# Patient Record
Sex: MALE | Race: WHITE | URBAN - METROPOLITAN AREA
[De-identification: names, ages, dates, MRNs, and addresses within clinical notes are randomized per-mention and may not be internally consistent; named-entity substitution may affect disease eponyms.]

---

## 2017-07-14 ENCOUNTER — TRANSFERRED RECORDS (OUTPATIENT)
Dept: HEALTH INFORMATION MANAGEMENT | Facility: CLINIC | Age: 72
End: 2017-07-14

## 2017-07-17 ENCOUNTER — TRANSFERRED RECORDS (OUTPATIENT)
Dept: HEALTH INFORMATION MANAGEMENT | Facility: CLINIC | Age: 72
End: 2017-07-17

## 2018-01-24 ENCOUNTER — RADIANT APPOINTMENT (OUTPATIENT)
Dept: GENERAL RADIOLOGY | Facility: CLINIC | Age: 73
End: 2018-01-24
Attending: FAMILY MEDICINE
Payer: COMMERCIAL

## 2018-01-24 ENCOUNTER — OFFICE VISIT (OUTPATIENT)
Dept: ORTHOPEDICS | Facility: CLINIC | Age: 73
End: 2018-01-24
Payer: COMMERCIAL

## 2018-01-24 VITALS
BODY MASS INDEX: 26.21 KG/M2 | DIASTOLIC BLOOD PRESSURE: 71 MMHG | SYSTOLIC BLOOD PRESSURE: 116 MMHG | WEIGHT: 172.9 LBS | HEIGHT: 68 IN | OXYGEN SATURATION: 96 % | HEART RATE: 81 BPM

## 2018-01-24 DIAGNOSIS — M17.12 PRIMARY OSTEOARTHRITIS OF LEFT KNEE: ICD-10-CM

## 2018-01-24 DIAGNOSIS — M25.562 ARTHRALGIA OF LEFT LOWER LEG: Primary | ICD-10-CM

## 2018-01-24 DIAGNOSIS — M25.569 PAIN IN JOINT, LOWER LEG: ICD-10-CM

## 2018-01-24 PROCEDURE — 99213 OFFICE O/P EST LOW 20 MIN: CPT | Performed by: FAMILY MEDICINE

## 2018-01-24 PROCEDURE — 73562 X-RAY EXAM OF KNEE 3: CPT | Mod: LT | Performed by: RADIOLOGY

## 2018-01-24 ASSESSMENT — PAIN SCALES - GENERAL: PAINLEVEL: SEVERE PAIN (6)

## 2018-01-24 NOTE — LETTER
1/24/2018         RE: Jaswinder Li  [address] [facility] [id number]        Dear Colleague,    Thank you for referring your patient, Jaswinder Li, to the [facility]. Please see a copy of my visit note below.    CHIEF COMPLAINT:  Consult (left knee pain X 3years. )       HISTORY OF PRESENT ILLNESS  Mr. Li is a pleasant 72 year old year old male who presents to clinic today with left knee pain.    Luis Antonio has had bilateral knee pain for many years.  He had a right medial partial knee replacement done 11 years ago by Dr. Jess Bobo.  This completely took care of his pain.  He continues to have left knee pain, medial side only, that has bothered him since his right knee replacement.  He has been seen and followed at [facility], over the years she has had multiple corticosteroid injections and other conservative treatments.  His most recent corticosteroid injection was in June 2017.    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  He is interested in discussing knee replacement.      Additional history: as documented    MEDICAL HISTORY  Patient Active Problem List   Diagnosis     BCC (basal cell carcinoma), face       Current Outpatient Prescriptions   Medication Sig Dispense Refill     IBUPROFEN PO        aspirin 325 MG EC tablet Take  by mouth daily.       terazosin (HYTRIN) 2 MG capsule Take  by mouth At Bedtime.         No Known Allergies    History reviewed. No pertinent family history.    Additional medical/Social/Surgical histories reviewed in [facility] and updated as appropriate.     REVIEW OF SYSTEMS (1/24/2018)  CONSTITUTIONAL: Denies fever and weight loss  EYES: Denies acute vision changes  ENT: Denies hearing changes or difficulty swallowing  CARDIAC: Denies chest pain or edema  RESPIRATORY: Denies dyspnea, cough or wheeze  GASTROINTESTINAL: Denies abdominal pain, nausea, vomiting  MUSCULOSKELETAL: See HPI  SKIN: Denies any  "recent rash or lesion  NEUROLOGICAL: Denies numbness or focal weakness  PSYCHIATRIC: No history of psychiatric symptoms or problems  ENDOCRINE: Denies current diagnosis of diabetes  HEMATOLOGY: Denies episodes of easy bleeding      PHYSICAL EXAM  Vitals:    01/24/18 1103   BP: 116/71   Pulse: 81   SpO2: 96%   Weight: 78.4 kg (172 lb 14.4 oz)   Height: 1.72 m (5' 7.72\")     General  - normal appearance, in no obvious distress  CV  - normal popliteal pulse  Pulm  - normal respiratory pattern, non-labored  Musculoskeletal - left knee  - stance: genu varum  - inspection: no swelling, no effusion  - palpation: medial joint line tenderness, patella and patellar tendon non-tender, normal popliteal pulse  - ROM: 135 degrees flexion, 0 degrees extension  - strength: 5/5 in flexion, 5/5 in extension  - neuro: no sensory or motor deficit  Neuro  - no sensory or motor deficit, grossly normal coordination, normal muscle tone  Skin  - no ecchymosis, erythema, warmth, or induration, no obvious rash  Psych  - interactive, appropriate, normal mood and affect        ASSESSMENT & PLAN  Mr. Li is a 72 year old year old male who presents to clinic today with left knee osteoarthritis.    I ordered and reviewed plain radiographs of his knee.  He does have severe medial compartment narrowing.    We had a preliminary discussion centering around total knee arthroplasty and partial knee arthroplasty, I am referring him to Dr. Branden Fernandez to have this discussion in depth.  Luis Antonio's symptoms are controlled for now, he is going to continue conservative measures until his appointment.    It was a pleasure seeing Luis Antonio.    Donald Bobo DO, [facility]  Primary Care Sports Medicine      Again, thank you for allowing me to participate in the care of your patient.        Sincerely,        Donald Bobo DO    "

## 2018-01-24 NOTE — PROGRESS NOTES
CHIEF COMPLAINT:  Consult (left knee pain X 3years. )       HISTORY OF PRESENT ILLNESS  Mr. Li is a pleasant 72 year old year old male who presents to clinic today with left knee pain.    Luis Antonio has had bilateral knee pain for many years.  He had a right medial partial knee replacement done 11 years ago by Dr. Jess Bobo.  This completely took care of his pain.  He continues to have left knee pain, medial side only, that has bothered him since his right knee replacement.  He has been seen and followed at [facility], over the years she has had multiple corticosteroid injections and other conservative treatments.  His most recent corticosteroid injection was in June 2017.    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  He is interested in discussing knee replacement.      Additional history: as documented    MEDICAL HISTORY  Patient Active Problem List   Diagnosis     BCC (basal cell carcinoma), face       Current Outpatient Prescriptions   Medication Sig Dispense Refill     IBUPROFEN PO        aspirin 325 MG EC tablet Take  by mouth daily.       terazosin (HYTRIN) 2 MG capsule Take  by mouth At Bedtime.         No Known Allergies    History reviewed. No pertinent family history.    Additional medical/Social/Surgical histories reviewed in [facility] and updated as appropriate.     REVIEW OF SYSTEMS (1/24/2018)  CONSTITUTIONAL: Denies fever and weight loss  EYES: Denies acute vision changes  ENT: Denies hearing changes or difficulty swallowing  CARDIAC: Denies chest pain or edema  RESPIRATORY: Denies dyspnea, cough or wheeze  GASTROINTESTINAL: Denies abdominal pain, nausea, vomiting  MUSCULOSKELETAL: See HPI  SKIN: Denies any recent rash or lesion  NEUROLOGICAL: Denies numbness or focal weakness  PSYCHIATRIC: No history of psychiatric symptoms or problems  ENDOCRINE: Denies current diagnosis of diabetes  HEMATOLOGY: Denies episodes of easy bleeding      PHYSICAL EXAM  Vitals:  "   01/24/18 1103   BP: 116/71   Pulse: 81   SpO2: 96%   Weight: 78.4 kg (172 lb 14.4 oz)   Height: 1.72 m (5' 7.72\")     General  - normal appearance, in no obvious distress  CV  - normal popliteal pulse  Pulm  - normal respiratory pattern, non-labored  Musculoskeletal - left knee  - stance: genu varum  - inspection: no swelling, no effusion  - palpation: medial joint line tenderness, patella and patellar tendon non-tender, normal popliteal pulse  - ROM: 135 degrees flexion, 0 degrees extension  - strength: 5/5 in flexion, 5/5 in extension  - neuro: no sensory or motor deficit  Neuro  - no sensory or motor deficit, grossly normal coordination, normal muscle tone  Skin  - no ecchymosis, erythema, warmth, or induration, no obvious rash  Psych  - interactive, appropriate, normal mood and affect        ASSESSMENT & PLAN  Mr. Li is a 72 year old year old male who presents to clinic today with left knee osteoarthritis.    I ordered and reviewed plain radiographs of his knee.  He does have severe medial compartment narrowing.    We had a preliminary discussion centering around total knee arthroplasty and partial knee arthroplasty, I am referring him to Dr. Branden Fernandez to have this discussion in depth.  Luis Antonio's symptoms are controlled for now, he is going to continue conservative measures until his appointment.    It was a pleasure seeing Luis Antonio.    Donald Bobo DO, [facility]  Primary Care Sports Medicine    "

## 2018-01-24 NOTE — NURSING NOTE
"Jaswinder Li's goals for this visit include: evaluate left knee pain  He requests these members of his care team be copied on today's visit information: yes    PCP: Alberto Garza    Referring Provider:  No referring provider defined for this encounter.    Chief Complaint   Patient presents with     Consult     left knee pain X 3years.        Initial /71  Pulse 81  Ht 1.72 m (5' 7.72\")  Wt 78.4 kg (172 lb 14.4 oz)  SpO2 96%  BMI 26.51 kg/m2 Estimated body mass index is 26.51 kg/(m^2) as calculated from the following:    Height as of this encounter: 1.72 m (5' 7.72\").    Weight as of this encounter: 78.4 kg (172 lb 14.4 oz).  Medication Reconciliation: complete    "

## 2018-01-24 NOTE — MR AVS SNAPSHOT
After Visit Summary   2018    Jaswinder Li    MRN: [id number]           Patient Information     Date Of Birth          1945        Visit Information        Provider Department      2018 11:00 AM Donald Bobo,  [facility]        Care Instructions    Thanks for coming today.  Ortho/Sports Medicine Clinic  [address] [place] [address]    To schedule future appointments in Ortho Clinic, you may call [phone].    To schedule ordered imaging by your provider:   Call Central Imaging [phone]    To schedule an injection ordered by your provider:  Call Central Imaging Injection scheduling line: [phone]  Pretty Simple available online at:  [email or web address]    Please call if any further questions or concerns ([phone]).  Clinic hours 8 am to 5 pm.    Return to clinic (call) if symptoms worsen or fail to improve.            Follow-ups after your visit        Who to contact     If you have questions or need follow up information about today's clinic visit or your schedule please contact [facility] directly at [phone].  Normal or non-critical lab and imaging results will be communicated to you by MyChart, letter or phone within 4 business days after the clinic has received the results. If you do not hear from us within 7 days, please contact the clinic through NEMOPTIChart or phone. If you have a critical or abnormal lab result, we will notify you by phone as soon as possible.  Submit refill requests through Pretty Simple or call your pharmacy and they will forward the refill request to us. Please allow 3 business days for your refill to be completed.          Additional Information About Your Visit        MyChart Information     Pretty Simple is an electronic gateway that provides easy, online access to your medical records. With Pretty Simple, you can request a clinic appointment, read your test results, renew a prescription or  "communicate with your care team.     To sign up for [facility] visit the website at [email or web address]   You will be asked to enter the access code listed below, as well as some personal information. Please follow the directions to create your username and password.     Your access code is: [email or web address]  Expires: 2018 11:34 AM     Your access code will  in 90 days. If you need help or a new code, please contact your [facility] or call [phone] for assistance.        Care EveryWhere ID     This is your Care EveryWhere ID. This could be used by other organizations to access your [facility] medical records  [id number]        Your Vitals Were     Pulse Height Pulse Oximetry BMI (Body Mass Index)          81 1.72 m (5' 7.72\") 96% 26.51 kg/m2         Blood Pressure from Last 3 Encounters:   18 116/71   13 111/69   11 122/63    Weight from Last 3 Encounters:   18 78.4 kg (172 lb 14.4 oz)   11 77.1 kg (170 lb)              Today, you had the following     No orders found for display       Primary Care Provider Office Phone # Fax #    Alberto Garza [phone]       XXX RETIRED XXX  XXX [place] [address]        Equal Access to Services     [facility]: Hadii quan morales hadstano Somatilda, waaxda luqadaha, qaybta kaalmada lois, deirdre buckner . So [facility] [phone].    ATENCIÓN: Si habla español, tiene a de la rosa disposición servicios gratuitos de asistencia lingüística. Mell al [phone].    We comply with applicable federal civil rights laws and [place] laws. We do not discriminate on the basis of race, color, national origin, age, disability, sex, sexual orientation, or gender identity.            Thank you!     Thank you for choosing [facility]  for your care. Our goal is always to provide you with excellent care. Hearing back from our patients is one way we can continue to improve " our services. Please take a few minutes to complete the written survey that you may receive in the mail after your visit with us. Thank you!             Your Updated Medication List - Protect others around you: Learn how to safely use, store and throw away your medicines at www.disposemymeds.org.          This list is accurate as of 1/24/18 11:34 AM.  Always use your most recent med list.                   Brand Name Dispense Instructions for use Diagnosis    aspirin 325 MG EC tablet      Take  by mouth daily.        IBUPROFEN PO           terazosin 2 MG capsule    HYTRIN     Take  by mouth At Bedtime.

## 2018-01-24 NOTE — PATIENT INSTRUCTIONS
Thanks for coming today.  Ortho/Sports Medicine Clinic  [address] [place] [address]    To schedule future appointments in Ortho Clinic, you may call [phone].    To schedule ordered imaging by your provider:   Call Central Imaging [phone]    To schedule an injection ordered by your provider:  Call Central Imaging Injection scheduling line: [phone]  [email or web address] available online at:  [email or web address]    Please call if any further questions or concerns ([phone]).  Clinic hours 8 am to 5 pm.    Return to clinic (call) if symptoms worsen or fail to improve.

## 2018-02-06 ENCOUNTER — TELEPHONE (OUTPATIENT)
Dept: ORTHOPEDICS | Facility: CLINIC | Age: 73
End: 2018-02-06

## 2018-02-06 ENCOUNTER — OFFICE VISIT (OUTPATIENT)
Dept: ORTHOPEDICS | Facility: CLINIC | Age: 73
End: 2018-02-06
Payer: COMMERCIAL

## 2018-02-06 VITALS — HEART RATE: 82 BPM | OXYGEN SATURATION: 94 % | DIASTOLIC BLOOD PRESSURE: 76 MMHG | SYSTOLIC BLOOD PRESSURE: 124 MMHG

## 2018-02-06 DIAGNOSIS — M25.562 LEFT KNEE PAIN, UNSPECIFIED CHRONICITY: Primary | ICD-10-CM

## 2018-02-06 DIAGNOSIS — M17.0 PRIMARY OSTEOARTHRITIS OF BOTH KNEES: Primary | ICD-10-CM

## 2018-02-06 PROCEDURE — 99204 OFFICE O/P NEW MOD 45 MIN: CPT | Performed by: ORTHOPAEDIC SURGERY

## 2018-02-06 ASSESSMENT — PAIN SCALES - GENERAL: PAINLEVEL: MODERATE PAIN (5)

## 2018-02-06 NOTE — PATIENT INSTRUCTIONS
[facility]  [address] [place] [address]  Phone [phone]  Fax [phone]    SURGICAL INFORMATION & INSTRUCTIONS  Dr. Branden Fernandez  Name of Surgery: Left total knee arthroplasty    Date of Surgery:     A surgery scheduler will contact you within 1 week of your office visit with the surgeon.  If you don't receive a phone call, please call [phone].    Arrival Time:     Time of Surgery:      Please arrive early so that we can prepare you for surgery, if you arrive later than your scheduled arrival time your surgery may be cancelled.  Please note that scheduled times may change.      Location of Surgery:     ? [facility]  [address]  [place], [place] [id number] [place] [address], 3rd floor for check-in  Phone [phone]  Fax [phone]  Bring parking ticket with you for validation and reduced parking rate  [email or web address]    Prior to surgery    ? Call your insurance company  Ask if you need pre-approval for your surgery.  If pre-approval is needed, please call our surgery scheduler for assistance with the pre-approval process.   If you do not have insurance, please let us know.     ? [place] [facility] Surgery (if on [facility])  10 days before surgery, call [phone] to register with the surgery center. Have your insurance card ready.     Total Joint Replacement:  - Joint Replacement Class:  If you are scheduled to have a joint replacement, you (and any family/friends that will be helping to care for you) are expected to attend an educational class at least two weeks prior to your surgery.  To register for the class please call the Patient Learning Center at [phone] or register online at [email or web address]. Classes are held at multiple locations as well as online.  You must know the date of your surgery before you can register for a class (approximate date OK). If  registering online, please select hip or knee as surgery type as shoulder has not been made an option at this time.     o This is also a good opportunity to think about where you would like to have physical therapy after your surgery. You may also be able to set up appointments in advance so that everything is ready to go after surgery.    - Antibiotics Prophylaxis:  Certain procedures such as dental cleaning/work, colonoscopies and other surgeries can cause bacteria to enter the bloodstream.  These bacteria can attach to joint replacement devices.  To reduce this risk, you will need to take antibiotics before you have invasive procedures or dental work.  This is known as antibiotic prophylaxis.    - Dental Visit:  You may want to schedule an appointment with your dentist for a cleaning or needed work before your surgery.  We advise no dental work or cleaning until 6 months after your surgery with implants to hip(s), knee(s), or shoulder(s).  Once you are 6 months past your surgery, you will need to take prophylactic (preventative) antibiotics for the rest of your life before having any dental cleaning or work.  If dental work is needed before surgery, make sure everything is completely healed prior to surgery.  It may cause delays or cancellation of surgery if not healed completely. This is also for any other invasive procedures you may have such as a colonoscopy.  Please notify the clinic if you have any questions.    ? Schedule an appointment with a Primary Care Provider for a Pre-Op Physical.  This should be done within 30 days of surgery  If you do not have a primary care provider, you may call [facility] at [phone], for an appointment.  Please have your office note and any labs or tests faxed to the facility where you are having surgery. Please be sure to request a copy of your pre-op physical and bring it with you on the day of surgery.      Tell your provider if you have any of the following:    - IF you have a pacemaker or an ICD (implanted cardiac defibrillator). If you do, please bring the ID card with you on the day of surgery  - IF you're a smoker. People who smoke have a higher risk of infection after surgery. Ask your provider how you can quit smoking.  - If you have diabetes, work with your provider to control your blood sugar. If its not well-controlled, we may need to delay surgery (or you may have problems healing afterward).  - If your surgeon asks you to see your dentist: You'll need to complete any dental work before surgery. Your dentist must send a letter to your surgery center saying it's ok to do the surgery.    ? Blood Bank Pre-Admission order (total joint replacement only):    You will need to have a Blood Type and Screen drawn at a [facility] location before your surgery.  Please check your form included in your packet to determine if it needs to be done 1-30 days before surgery or 1-3 days before surgery.    ? Pre-Op Phone Call  You will receive a pre-op phone call 1-3 days before surgery to review your eating and drinking restrictions, review medications, and confirm surgery times.      ? 7-10 days BEFORE surgery  ? Stop taking aspirin, Plavix or aspirin products 10 days before surgery or as directed by your doctor.  ? Stop taking non-steroidal anti-inflammatory medications (naproxen/Aleve, ibuprofen/Advil/Motrin, celecoxib/Celebrex, meloxicam/Mobic) 1 week before surgery or as directed by your surgeon.  This will reduce the risk of bleeding during surgery.  ? Stop taking fish oil (Omega-3-fatty acid) 1 week before surgery.  ? It is OK to take acetaminophen (Tylenol) up until 2 hours prior to surgery.  ? Take prescription medications as directed by your doctor.  Discuss which medications to take or hold prior to your surgery, with your primary care doctor.   ? If you have diabetes, ask your primary care doctor or endocrinologist how you should take your  medication(s).    ? 24 hours BEFORE surgery  Stop drinking alcohol (beer, wine, liquor) at least 24-hours before and after your surgery.     ? Evening BEFORE surgery  - You may eat a normal meal the night before surgery, but eat nothing after midnight.     - Take a shower - to help wash away bacteria (germs) from your skin.  It s normal to have bacteria on your skin and skin protects us from these germs.  When you have surgery, we cut the skin.  Sometimes germs get into the cuts and cause infection (illness caused by germs).  By following the showering instructions and using the special soap, you will lower the number of germs on your skin.  This decreases your chance of an infection.    - Buy or get 8 ounces of antiseptic surgical soap called 4% CHG.  Common brands of this soap are Hibiclens and Exidine.    - You can find it this soap at your local pharmacy, clinic or retail store.  If you have trouble finding it, ask your pharmacist to help you find the right substitute.    - Do not shave within 12 inches of your incision (surgical cut) area for at least 3 days before surgery.  Shaving can make small cuts in the skin. This puts you at a higher risk of infection.    Items you will need for each shower:   - Newly washed towel  - 4 ounces of one of the recommended soaps    Follow these instructions, the evening before surgery  - Wash your hair and body with your regular shampoo and soap. Make sure you rinse the shampoo and soap from your hair and body.  - Using clean hands, apply about 2 ounces of soap gently on your skin from the neck to your toes. Use on your groin area last. Do not use this soap on your face or head. If you get any soap in your eyes, ears or mouth, rinse right away.  - Once the soap has been on your skin for at least 1 minute, rinse off completely and repeat washing with the surgical soap one more time.  - Rinse well and dry off using a clean towel.  If you feel any tingling, itching or other  irritation, rinse right away. It is normal to feel some coolness on the skin after using the antiseptic soap. Your skin may feel a bit dry after the shower, but do not use any lotions, creams or moisturizers. Do not use hair spray or other products in your hair.  - Dress in freshly washed clothes or pajamas. Use fresh pillowcases and sheets on your bed.    ? Day of Surgery  - You may drink clear liquids up to 2 hours before surgery or as directed by your surgeon.  Clear liquids include: Water, Pedialyte, Gatorade, apple juice and liquids you can read through. Please avoid liquids that are red or orange in color.   - Do NOT drink: milk, coffee, liquids that have pulp, orange juice, and lemonade or tomato juice.   - Do NOT chew gum, chew tobacco or suck on hard candy.    - Take another shower          Follow these instructions:      - Wash your hair and body with your regular shampoo and soap. Make sure you rinse the shampoo and soap from your hair and body.  - Using clean hands, apply about 2 ounces of soap gently on your skin from the neck to your toes. Use on your groin area last. Do not use this soap on your face or head. If you get any soap in your eyes, ears or mouth, rinse right away.  - Once the soap has been on your skin for at least 1 minute, rinse off completely and repeat washing with the surgical soap one more time.  - Rinse well and dry off using a clean towel.  If you feel any tingling, itching or other irritation, rinse right away. It is normal to feel some coolness on the skin after using the antiseptic soap. Your skin may feel a bit dry after the shower, but do not use any lotions, creams or moisturizers. Do not use hair spray or other products in your hair.  - Dress in freshly washed clothes.  - Do not wear deodorant, cologne, lotion, makeup, nail polish or jewelry to surgery.    ? If there is any change in your health PRIOR to your surgery, please contact your surgeon's office.  Such as a fever,  body aches, fatigue, any flu-like symptoms, rash, or any new injury to related body part.    ? Arrange for someone to drive you home after surgery.    will need to be a responsible adult (18 years or older) that will provide transportation to and from surgery and stay in the waiting room during your surgery. You may not drive yourself or take public transportation to and from surgery.    ? Arrange for someone to stay with you for 24 hours after you go home.   This person must be a responsible adult (18 years or older).    ? Bring these items to the hospital/surgery center:   ? Insurance card(s)  ? Money for parking and co-pays, if needed  ? A list of all the medications you take, including vitamins, minerals, herbs and over the counter medications.    ? A copy of your Advance Health Care Directive, if you have one.  This tells us what treatment(s) you would or would not want, and who would make health care decisions, if you could no longer speak for yourself.    ? A case for glasses, contact lenses, hearing aids or dentures.   ? Your inhaler or CPAP machine, if you use these at home    ? Leave extra cash, jewelry and other valuables at home.       ? Other information:   Sleep Apnea: Let your nurse know if you have a history of sleep apnea, only if you are having surgery at the [facility].    When you arrive for surgery  When you get to the surgery center/hospital, you will:  - Check in. If you are under age 18, you must be with a parent or legal guardian.  - Sign consent forms, if you haven t already. These forms state that you know the risks and benefits of surgery. When you sign the forms, you give us permission to do the surgery. Do not sign them unless you understand what will happen during and after your surgery. If you have any questions about your surgery, ask to speak with your doctor before you sign the forms. If you don t understand the answers, ask again.  - Receive a copy of the  Patient s Bill of Rights. If you do not receive a copy, please ask for one.  - Change into hospital clothes. Your belongings will be placed in a bag. We will return them to you after surgery.  - Meet with the anesthesia provider. He or she will tell you what kind of anesthesia (medicine) will be used to keep you comfortable during surgery.  - Remember: it s okay to remind doctors and nurses to wash their hands before touching you.  - In most cases, your surgeon will use a marker to write his or her initials on the surgery site. This ensures that the exact site is operated on.  - For safety reasons, we will ask you the same questions many times. For example, we may ask your name and birth date over and over again.  - Friends and family can stay with you until it s time for surgery. While you re in surgery, they will be in the waiting area. Please note that cell phones are not allowed in some patient care areas.  - If you have questions about what will happen in the operating room, talk to your care team.  - You will meet with an anesthesiologist, before your surgery.  He or she may reference types of anesthesia commonly used for surgeries:   o General:  This involves the use of an IV for injection of medication and anesthesia. You are put into a sleep and have a breathing tube to assist you with breathing.  o Sedation:  You are asleep, but not so deply that you need a breathing tube.   o Local or Regional: a nerve is injected to numb the surgical area.  o Spinal: you are numbed from the waist down with medicine injected into your back.  o Femoral Nerve Block:  Anesthesia injected into the groin of leg which you are having surgery on.      After surgery  We will move you to a recovery room, where we will watch you closely. If you have any pain or discomfort, tell your nurse. He or she will try to make you comfortable.    - We will move you to your hospital room after you are awake and stable. If you having any pain or  discomfort, please let your nursing staff know.  - Please wash your hands every time you touch the wound or change bandages or dressings.  - Do not submerge the wound in water.  You may not use a bathtub, hot tub, pool, or lake until 3 months after surgery. Any open area can be a source of incoming bacteria, which can lead to infection. Keep all dressings clean and dry.   - Elevate your leg(s) as much as possible to help reduce swelling. You will also receive compression stockings for the surgical leg. Please wear these during the day and fully remove them at night. Continue to wear these for approximately 4 weeks after surgery or until your swelling has resolved.  - If you had a total knee replacement, you will be discharged with a Continuous Passive Motion (CPM) machine. You will use this for 2-6 weeks at home.   - You may put ice on the surgical area for comfort, keeping ice on area for up to 20 minutes then off for 40 minutes.  You may do this the first few days after surgery to help reduce pain and swelling.    - Drink at least 8-10 glasses of liquid each day to help your body heal.  - Keep your lungs clear by coughing and taking deep breaths every couple hours.  This is especially important the first 48 hours after surgery.  - Typically, you will be able to start driving once you are fully off narcotics and able to do a the quick stop test (slamming on the brake) with your right leg without experiencing much pain.  - You will start physical therapy while in the hospital. Once you leave the hospital, you will need to continue going to physical therapy to maintain and improve your range of motion and strength. Please call the physical therapy clinic of your choice to set up an appointment within 1 week of being discharged from the hospital.    - Notify your doctor if you have any of the following:   o Fever of 101 F or higher  o Numbness and/or tingling  o Increased pain, redness or swelling  o Drainage from  wound  o Prolonged or uncontrolled bleeding  o Difficulty with movement    Range of Motion Restrictions  These are strict for 3 months post surgery, but should be followed for life. As your muscles gain strength, you will notice that you will have more range of motion, but your risk for dislocating remains. See patient education packet for details  - NO bending past 90 degrees at the waist (operative side)   - NO crossing your operative leg over or under your non-operative leg  - NO turning your operative leg inward     Follow-up Appointments, in Clinic  If you don't already have an appointment scheduled, please call to set up an appointment at [phone].      ? Nurse Only Appointment (2 weeks post op)  ? Post-Op appointments with provider (6 weeks post op)    Dealing with pain  A nurse will check your comfort level often during your stay. He or she will work with you to manage your pain.  It s expected that you will have pain after surgery.  Our goal is to reduce or minimize pain by:   - Remember pain is real. There are many ways to control pain. We can help you decide what works best for you.  - Ask for pain medicine when you need it.  Don t try to  tough it out --this can make you feel worse. Always take your medicine as ordered.  - Medicine doesn t work the same for everyone. If your medicine isn t working, tell your nurse. There may be other medicines or treatments we can try.  - Medication Refills.  If you need refills on your pain medication, please call the clinic as soon as possible.  It may take 72-business hours to obtain a refill.  Refills must be picked up at check-in 2, [facility] or mailed to a pharmacy of your choice.    - It is expected that you will wean off the pain medications in a timely manner.   - Constipation is a common side effect of pain medication, decreased activity and anesthesia from surgery.  Take a stool softener as prescribed by your doctor at the time of  discharge.  You may also use over the counter medications as needed.  Be sure to increase your fiber (fruits and vegetables) and your water intake.      Please call the clinic at [phone], if you experience any problems or have questions.  If you are having an emergency, always call 820 or seek immediate evaluation at the Emergency Room.    Thank you for selecting the [facility] for your care!  ---------------------------------------------          Thanks for coming today.  Ortho/[facility] Medicine Clinic  [address] [place] [address]    To schedule future appointments in Ortho Clinic, you may call [phone].    To schedule ordered imaging by your provider:   Call Central Imaging [phone]    To schedule an injection ordered by your provider:  Call Central Imaging Injection scheduling line: [phone]  [employer] available online at:  [email or web address]    Please call if any further questions or concerns ([phone]).  Clinic hours 8 am to 5 pm.    Return to clinic (call) if symptoms worsen or fail to improve.

## 2018-02-06 NOTE — NURSING NOTE
"Jaswinder Li's goals for this visit include: Left knee pain  He requests these members of his care team be copied on today's visit information: Yes    PCP: System, Provider Not In    Referring Provider:  No referring provider defined for this encounter.    Chief Complaint   Patient presents with     Consult     Left knee pain       Initial /76 (BP Location: Left arm)  Pulse 82  SpO2 94% Estimated body mass index is 26.51 kg/(m^2) as calculated from the following:    Height as of 1/24/18: 1.72 m (5' 7.72\").    Weight as of 1/24/18: 78.4 kg (172 lb 14.4 oz).  Medication Reconciliation: complete    "

## 2018-02-06 NOTE — TELEPHONE ENCOUNTER
Procedure: left tka  Facility: [facility]  Length: 2hour(s)  Surgeon: Jamar BASS  Anesthesia: Choice  BMI: There is no height or weight on file to calculate BMI. (If over 43 for general or 45 for MAC cannot be scheduled at  [facility])   Pre-op Appointments needed: H&P within 30 days  Post-op appointments needed:2 weeks   nurse only 6 weeks   provider only   needed:  No  Surgery packet/instructions given to patient?  Yes   Special Considerations: none  Ramandeep Martinez RN

## 2018-02-06 NOTE — MR AVS SNAPSHOT
After Visit Summary   2/6/2018    Jaswinder Li    MRN: [id number]           Patient Information     Date Of Birth          1945        Visit Information        Provider Department      2/6/2018 8:00 AM Branden Fernandez MD [facility]        Care Instructions      [facility]  [address] [place] [id number]  Phone [phone]  Fax [phone]    SURGICAL INFORMATION & INSTRUCTIONS  Dr. Branden Fernandez  Name of Surgery: Left total knee arthroplasty    Date of Surgery:     A surgery scheduler will contact you within 1 week of your office visit with the surgeon.  If you don't receive a phone call, please call [phone].    Arrival Time:     Time of Surgery:      Please arrive early so that we can prepare you for surgery, if you arrive later than your scheduled arrival time your surgery may be cancelled.  Please note that scheduled times may change.      Location of Surgery:     ? [facility], [address], 3rd floor for check-in  Phone [phone]  Fax [phone]  Bring parking ticket with you for validation and reduced parking rate  [email or web address]    Prior to surgery    ? Call your insurance company  Ask if you need pre-approval for your surgery.  If pre-approval is needed, please call our surgery scheduler for assistance with the pre-approval process.   If you do not have insurance, please let us know.     ? [facility] (if on [facility])  10 days before surgery, call [phone] to register with the surgery center. Have your insurance card ready.     Total Joint Replacement:  - Joint Replacement Class:  If you are scheduled to have a joint replacement, you (and any family/friends that will be helping to care for you) are expected to attend an educational class at least two weeks prior to your  surgery.  To register for the class please call the Patient Learning Center at [phone] or register online at [email or web address]. Classes are held at multiple locations as well as online.  You must know the date of your surgery before you can register for a class (approximate date OK). If registering online, please select hip or knee as surgery type as shoulder has not been made an option at this time.     o This is also a good opportunity to think about where you would like to have physical therapy after your surgery. You may also be able to set up appointments in advance so that everything is ready to go after surgery.    - Antibiotics Prophylaxis:  Certain procedures such as dental cleaning/work, colonoscopies and other surgeries can cause bacteria to enter the bloodstream.  These bacteria can attach to joint replacement devices.  To reduce this risk, you will need to take antibiotics before you have invasive procedures or dental work.  This is known as antibiotic prophylaxis.    - Dental Visit:  You may want to schedule an appointment with your dentist for a cleaning or needed work before your surgery.  We advise no dental work or cleaning until 6 months after your surgery with implants to hip(s), knee(s), or shoulder(s).  Once you are 6 months past your surgery, you will need to take prophylactic (preventative) antibiotics for the rest of your life before having any dental cleaning or work.  If dental work is needed before surgery, make sure everything is completely healed prior to surgery.  It may cause delays or cancellation of surgery if not healed completely. This is also for any other invasive procedures you may have such as a colonoscopy.  Please notify the clinic if you have any questions.    ? Schedule an appointment with a Primary Care Provider for a Pre-Op Physical.  This should be done within 30 days of surgery  If you do not have a primary care provider, you may call [facility] at [phone], for an appointment.  Please have your office note and any labs or tests faxed to the facility where you are having surgery. Please be sure to request a copy of your pre-op physical and bring it with you on the day of surgery.      Tell your provider if you have any of the following:   - IF you have a pacemaker or an ICD (implanted cardiac defibrillator). If you do, please bring the ID card with you on the day of surgery  - IF you're a smoker. People who smoke have a higher risk of infection after surgery. Ask your provider how you can quit smoking.  - If you have diabetes, work with your provider to control your blood sugar. If its not well-controlled, we may need to delay surgery (or you may have problems healing afterward).  - If your surgeon asks you to see your dentist: You'll need to complete any dental work before surgery. Your dentist must send a letter to your surgery center saying it's ok to do the surgery.    ? Blood Bank Pre-Admission order (total joint replacement only):    You will need to have a Blood Type and Screen drawn at a [facility] location before your surgery.  Please check your form included in your packet to determine if it needs to be done 1-30 days before surgery or 1-3 days before surgery.    ? Pre-Op Phone Call  You will receive a pre-op phone call 1-3 days before surgery to review your eating and drinking restrictions, review medications, and confirm surgery times.      ? 7-10 days BEFORE surgery  ? Stop taking aspirin, Plavix or aspirin products 10 days before surgery or as directed by your doctor.  ? Stop taking non-steroidal anti-inflammatory medications (naproxen/Aleve, ibuprofen/Advil/Motrin, celecoxib/Celebrex, meloxicam/Mobic) 1 week before surgery or as directed by your surgeon.  This will reduce the risk of bleeding during surgery.  ? Stop taking fish oil (Omega-3-fatty acid) 1 week before surgery.  ? It is OK to take acetaminophen (Tylenol) up  until 2 hours prior to surgery.  ? Take prescription medications as directed by your doctor.  Discuss which medications to take or hold prior to your surgery, with your primary care doctor.   ? If you have diabetes, ask your primary care doctor or endocrinologist how you should take your medication(s).    ? 24 hours BEFORE surgery  Stop drinking alcohol (beer, wine, liquor) at least 24-hours before and after your surgery.     ? Evening BEFORE surgery  - You may eat a normal meal the night before surgery, but eat nothing after midnight.     - Take a shower - to help wash away bacteria (germs) from your skin.  It s normal to have bacteria on your skin and skin protects us from these germs.  When you have surgery, we cut the skin.  Sometimes germs get into the cuts and cause infection (illness caused by germs).  By following the showering instructions and using the special soap, you will lower the number of germs on your skin.  This decreases your chance of an infection.    - Buy or get 8 ounces of antiseptic surgical soap called 4% CHG.  Common brands of this soap are Hibiclens and Exidine.    - You can find it this soap at your local pharmacy, clinic or retail store.  If you have trouble finding it, ask your pharmacist to help you find the right substitute.    - Do not shave within 12 inches of your incision (surgical cut) area for at least 3 days before surgery.  Shaving can make small cuts in the skin. This puts you at a higher risk of infection.    Items you will need for each shower:   - Newly washed towel  - 4 ounces of one of the recommended soaps    Follow these instructions, the evening before surgery  - Wash your hair and body with your regular shampoo and soap. Make sure you rinse the shampoo and soap from your hair and body.  - Using clean hands, apply about 2 ounces of soap gently on your skin from the neck to your toes. Use on your groin area last. Do not use this soap on your face or head. If you get  any soap in your eyes, ears or mouth, rinse right away.  - Once the soap has been on your skin for at least 1 minute, rinse off completely and repeat washing with the surgical soap one more time.  - Rinse well and dry off using a clean towel.  If you feel any tingling, itching or other irritation, rinse right away. It is normal to feel some coolness on the skin after using the antiseptic soap. Your skin may feel a bit dry after the shower, but do not use any lotions, creams or moisturizers. Do not use hair spray or other products in your hair.  - Dress in freshly washed clothes or pajamas. Use fresh pillowcases and sheets on your bed.    ? Day of Surgery  - You may drink clear liquids up to 2 hours before surgery or as directed by your surgeon.  Clear liquids include: Water, Pedialyte, Gatorade, apple juice and liquids you can read through. Please avoid liquids that are red or orange in color.   - Do NOT drink: milk, coffee, liquids that have pulp, orange juice, and lemonade or tomato juice.   - Do NOT chew gum, chew tobacco or suck on hard candy.    - Take another shower          Follow these instructions:      - Wash your hair and body with your regular shampoo and soap. Make sure you rinse the shampoo and soap from your hair and body.  - Using clean hands, apply about 2 ounces of soap gently on your skin from the neck to your toes. Use on your groin area last. Do not use this soap on your face or head. If you get any soap in your eyes, ears or mouth, rinse right away.  - Once the soap has been on your skin for at least 1 minute, rinse off completely and repeat washing with the surgical soap one more time.  - Rinse well and dry off using a clean towel.  If you feel any tingling, itching or other irritation, rinse right away. It is normal to feel some coolness on the skin after using the antiseptic soap. Your skin may feel a bit dry after the shower, but do not use any lotions, creams or moisturizers. Do not use  hair spray or other products in your hair.  - Dress in freshly washed clothes.  - Do not wear deodorant, cologne, lotion, makeup, nail polish or jewelry to surgery.    ? If there is any change in your health PRIOR to your surgery, please contact your surgeon's office.  Such as a fever, body aches, fatigue, any flu-like symptoms, rash, or any new injury to related body part.    ? Arrange for someone to drive you home after surgery.    will need to be a responsible adult (18 years or older) that will provide transportation to and from surgery and stay in the waiting room during your surgery. You may not drive yourself or take public transportation to and from surgery.    ? Arrange for someone to stay with you for 24 hours after you go home.   This person must be a responsible adult (18 years or older).    ? Bring these items to the hospital/surgery center:   ? Insurance card(s)  ? Money for parking and co-pays, if needed  ? A list of all the medications you take, including vitamins, minerals, herbs and over the counter medications.    ? A copy of your Advance Health Care Directive, if you have one.  This tells us what treatment(s) you would or would not want, and who would make health care decisions, if you could no longer speak for yourself.    ? A case for glasses, contact lenses, hearing aids or dentures.   ? Your inhaler or CPAP machine, if you use these at home    ? Leave extra cash, jewelry and other valuables at home.       ? Other information:   Sleep Apnea: Let your nurse know if you have a history of sleep apnea, only if you are having surgery at the [facility].    When you arrive for surgery  When you get to the surgery center/hospital, you will:  - Check in. If you are under age 18, you must be with a parent or legal guardian.  - Sign consent forms, if you haven t already. These forms state that you know the risks and benefits of surgery. When you sign the forms, you give us  permission to do the surgery. Do not sign them unless you understand what will happen during and after your surgery. If you have any questions about your surgery, ask to speak with your doctor before you sign the forms. If you don t understand the answers, ask again.  - Receive a copy of the Patient s Bill of Rights. If you do not receive a copy, please ask for one.  - Change into hospital clothes. Your belongings will be placed in a bag. We will return them to you after surgery.  - Meet with the anesthesia provider. He or she will tell you what kind of anesthesia (medicine) will be used to keep you comfortable during surgery.  - Remember: it s okay to remind doctors and nurses to wash their hands before touching you.  - In most cases, your surgeon will use a marker to write his or her initials on the surgery site. This ensures that the exact site is operated on.  - For safety reasons, we will ask you the same questions many times. For example, we may ask your name and birth date over and over again.  - Friends and family can stay with you until it s time for surgery. While you re in surgery, they will be in the waiting area. Please note that cell phones are not allowed in some patient care areas.  - If you have questions about what will happen in the operating room, talk to your care team.  - You will meet with an anesthesiologist, before your surgery.  He or she may reference types of anesthesia commonly used for surgeries:   o General:  This involves the use of an IV for injection of medication and anesthesia. You are put into a sleep and have a breathing tube to assist you with breathing.  o Sedation:  You are asleep, but not so deply that you need a breathing tube.   o Local or Regional: a nerve is injected to numb the surgical area.  o Spinal: you are numbed from the waist down with medicine injected into your back.  o Femoral Nerve Block:  Anesthesia injected into the groin of leg which you are having surgery  on.      After surgery  We will move you to a recovery room, where we will watch you closely. If you have any pain or discomfort, tell your nurse. He or she will try to make you comfortable.    - We will move you to your hospital room after you are awake and stable. If you having any pain or discomfort, please let your nursing staff know.  - Please wash your hands every time you touch the wound or change bandages or dressings.  - Do not submerge the wound in water.  You may not use a bathtub, hot tub, pool, or lake until 3 months after surgery. Any open area can be a source of incoming bacteria, which can lead to infection. Keep all dressings clean and dry.   - Elevate your leg(s) as much as possible to help reduce swelling. You will also receive compression stockings for the surgical leg. Please wear these during the day and fully remove them at night. Continue to wear these for approximately 4 weeks after surgery or until your swelling has resolved.  - If you had a total knee replacement, you will be discharged with a Continuous Passive Motion (CPM) machine. You will use this for 2-6 weeks at home.   - You may put ice on the surgical area for comfort, keeping ice on area for up to 20 minutes then off for 40 minutes.  You may do this the first few days after surgery to help reduce pain and swelling.    - Drink at least 8-10 glasses of liquid each day to help your body heal.  - Keep your lungs clear by coughing and taking deep breaths every couple hours.  This is especially important the first 48 hours after surgery.  - Typically, you will be able to start driving once you are fully off narcotics and able to do a the quick stop test (slamming on the brake) with your right leg without experiencing much pain.  - You will start physical therapy while in the hospital. Once you leave the hospital, you will need to continue going to physical therapy to maintain and improve your range of motion and strength. Please call  the physical therapy clinic of your choice to set up an appointment within 1 week of being discharged from the hospital.    - Notify your doctor if you have any of the following:   o Fever of 101 F or higher  o Numbness and/or tingling  o Increased pain, redness or swelling  o Drainage from wound  o Prolonged or uncontrolled bleeding  o Difficulty with movement    Range of Motion Restrictions  These are strict for 3 months post surgery, but should be followed for life. As your muscles gain strength, you will notice that you will have more range of motion, but your risk for dislocating remains. See patient education packet for details  - NO bending past 90 degrees at the waist (operative side)   - NO crossing your operative leg over or under your non-operative leg  - NO turning your operative leg inward     Follow-up Appointments, in Clinic  If you don't already have an appointment scheduled, please call to set up an appointment at [phone].      ? Nurse Only Appointment (2 weeks post op)  ? Post-Op appointments with provider (6 weeks post op)    Dealing with pain  A nurse will check your comfort level often during your stay. He or she will work with you to manage your pain.  It s expected that you will have pain after surgery.  Our goal is to reduce or minimize pain by:   - Remember pain is real. There are many ways to control pain. We can help you decide what works best for you.  - Ask for pain medicine when you need it.  Don t try to  tough it out --this can make you feel worse. Always take your medicine as ordered.  - Medicine doesn t work the same for everyone. If your medicine isn t working, tell your nurse. There may be other medicines or treatments we can try.  - Medication Refills.  If you need refills on your pain medication, please call the clinic as soon as possible.  It may take 72-business hours to obtain a refill.  Refills must be picked up at check-in 2, [facility] or mailed  to a pharmacy of your choice.    - It is expected that you will wean off the pain medications in a timely manner.   - Constipation is a common side effect of pain medication, decreased activity and anesthesia from surgery.  Take a stool softener as prescribed by your doctor at the time of discharge.  You may also use over the counter medications as needed.  Be sure to increase your fiber (fruits and vegetables) and your water intake.      Please call the clinic at [phone], if you experience any problems or have questions.  If you are having an emergency, always call 171 or seek immediate evaluation at the Emergency Room.    Thank you for selecting the [facility] for your care!  ---------------------------------------------          Thanks for coming today.  Ortho/Sports Medicine Clinic  [address] [place] [address]    To schedule future appointments in Ortho [facility], you may call [phone].    To schedule ordered imaging by your provider:   Call Central Imaging [phone]    To schedule an injection ordered by your provider:  Call Central Imaging Injection scheduling line: [phone]  Builkt available online at:  [email or web address]    Please call if any further questions or concerns ([phone]).  Clinic hours 8 am to 5 pm.    Return to clinic (call) if symptoms worsen or fail to improve.          Follow-ups after your visit        Who to contact     If you have questions or need follow up information about today's clinic visit or your schedule please contact [facility] directly at [phone].  Normal or non-critical lab and imaging results will be communicated to you by MyChart, letter or phone within 4 business days after the clinic has received the results. If you do not hear from us within 7 days, please contact the clinic through MyChart or phone. If you have a critical or abnormal lab result, we will notify you by phone as  soon as possible.  Submit refill requests through [employer] or call your pharmacy and they will forward the refill request to us. Please allow 3 business days for your refill to be completed.          Additional Information About Your Visit        [employer] Information     [employer] is an electronic gateway that provides easy, online access to your medical records. With [employer], you can request a clinic appointment, read your test results, renew a prescription or communicate with your care team.     To sign up for [employer] visit the website at [email or web address]   You will be asked to enter the access code listed below, as well as some personal information. Please follow the directions to create your username and password.     Your access code is: [email or web address]  Expires: 2018 11:34 AM     Your access code will  in 90 days. If you need help or a new code, please contact your [facility] or call [phone] for assistance.        Care EveryWhere ID     This is your Care EveryWhere ID. This could be used by other organizations to access your [facility] medical records  [id number]        Your Vitals Were     Pulse Pulse Oximetry                82 94%           Blood Pressure from Last 3 Encounters:   18 124/76   18 116/71   13 111/69    Weight from Last 3 Encounters:   18 78.4 kg (172 lb 14.4 oz)   11 77.1 kg (170 lb)              Today, you had the following     No orders found for display       Primary Care Provider Fax #    Provider Not In System [phone]                Equal Access to Services     SAGAR BINGHAM : Hadii aad ku hadasho Soomaali, waaxda luqadaha, qaybta kaalmada adeegyada, deirdre buckner . So [facility] [phone].    ATENCIÓN: Si habla español, tiene a de la rosa disposición servicios gratuitos de asistencia lingüística. Llame al [phone].    We comply with applicable federal civil rights laws and [place]  laws. We do not discriminate on the basis of race, color, national origin, age, disability, sex, sexual orientation, or gender identity.            Thank you!     Thank you for choosing [facility]  for your care. Our goal is always to provide you with excellent care. Hearing back from our patients is one way we can continue to improve our services. Please take a few minutes to complete the written survey that you may receive in the mail after your visit with us. Thank you!             Your Updated Medication List - Protect others around you: Learn how to safely use, store and throw away your medicines at www.disposemymeds.org.          This list is accurate as of 2/6/18  8:43 AM.  Always use your most recent med list.                   Brand Name Dispense Instructions for use Diagnosis    aspirin 325 MG EC tablet      Take  by mouth daily.        IBUPROFEN PO           terazosin 2 MG capsule    HYTRIN     Take  by mouth At Bedtime.

## 2018-02-06 NOTE — LETTER
2/6/2018         RE: Jaswinder Li  [address] [facility] [address]        Dear Colleague,    Thank you for referring your patient, Jaswinder Li, to the [facility]. Please see a copy of my visit note below.    Chief Complaint: Consult (Left knee pain)    Physician:  No ref. provider found    HPI: Jaswinder Li is a 72 year old male who presents today for evaluation of his left knee.     Symptom Profile  Location of symptoms:  Medial > lateral joint line   Onset: insidious  Duration of symptoms: multiple years   Quality of symptoms: aching and sharp/stabbing   Severity:severe  Alleviate:activity modification   Exacerbating: activities   Previous Treatments: Previous treatments include activity modification, oral pain medication, multiple intra-articular steroid injection.     MEDICAL HISTORY: No past medical history on file.  BPH  Basal cell CA, tx    Pertinent negatives:  Patient has no history of DVT or PE. Discussed risk factors.    Medications:     Current Outpatient Prescriptions:      IBUPROFEN PO, , Disp: , Rfl:      aspirin 325 MG EC tablet, Take  by mouth daily., Disp: , Rfl:      terazosin (HYTRIN) 2 MG capsule, Take  by mouth At Bedtime., Disp: , Rfl:     Allergies: Review of patient's allergies indicates no known allergies.    SURGICAL HISTORY: No past surgical history on file.   Right knee uni doing very well. 2007  Bilateral CMC arthroplasties     FAMILY HISTORY: No family history on file.    SOCIAL HISTORY:   Social History   Substance Use Topics     Smoking status: Former Smoker     Types: Cigarettes     Smokeless tobacco: Never Used     Alcohol use Not on file   working, repairs fitness equiptment    REVIEW OF SYSTEMS:  The comprehensive review of systems from the intake form was reviewed with the patient.  No fever, weight change or fatigue. No dry eyes. No oral ulcers, sore throat or voice change. No palpitations, syncope, angina or edema.  No chest pain,  excessive sleepiness, shortness of breath or hemoptysis.   No abdominal pain, nausea, vomiting, diarrhea or heartburn.  No skin rash. No focal weakness or numbness. No bleeding or lymphadenopathy. No rhinitis or hives.     Exam:  On physical examination the patient appears the stated age, is in no acute distress, affectThe is appropriate, and breathing is non-labored.  Vitals are documented in the EMR and have been reviewed:    /76 (BP Location: Left arm)  Pulse 82  SpO2 94%  Data Unavailable  There is no height or weight on file to calculate BMI.    Rises from chair: with some effort   Gait: antalgic gait favoring the left side   Gains the exam table: with effort  Knee is benign in appearance without redness or effusion  ROM is , ligament examination is stable  Distally, the circulatory, motor, and sensation exam is intact with 5/5 EHL, gastroc-soleus, and tibialis anterior.  Sensation to light touch is intact.  Dorsalis pedis and posterior tibialis pulses are palpable.  There are no sores on the feet, no bruising, and no lymphedema.    X-rays:   Final Report   EXAM: XR KNEE LT 3 VW 1/24/2018 11:49 AM     HISTORY: ; Pain in joint, lower leg    COMPARISON: Outside study 5/10/2016    FINDINGS: Standing AP, lateral, and patellofemoral views of the right  knee.    No acute osseous abnormality.    Moderate severe medial compartment joint space loss, similar to prior  outside study. No patellar tilt or subluxation. Soft tissues  unremarkable.     IMPRESSION: Moderate severe medial compartment joint space loss.    NEVIN YEUNG MD (Joe)  Principal   Name: NEVIN YEUNG  Provider ID: [id number]      Assessment: This is a 72 year old with severe knee pain interfering with activities of daily living associated with advanced knee osteoarthritis with complete loss of the joint space. He has had comprehensive non-operative management including activity modification, oral pain medication, and  multiple intra-articular injections. We discussed the treatment options including living with it and total knee. We discussed that he may not be the best candidate for a uni like he has on the right because of the shift of the femur on the tibia. We spent approximately 20 minutes discussing the risks and benefits of total knee arthroplasty.  We reviewed the proposed surgical plan.  The discussion included but was not limited to the following:  Blood clots, blood clots to the lungs, injury to blood vessels and nerves, the literature as it pertains to known problems with leg length discrepancy, stiffness, anterior knee pain, and infection. We discussed the post-operative recovery for the typical patient, return to driving, and return to normal activities.  All the patient's questions were answered to the best of my ability and would like to proceed.    Plan:  Left total knee       Again, thank you for allowing me to participate in the care of your patient.        Sincerely,        Branden Fernandez MD

## 2018-02-06 NOTE — PROGRESS NOTES
Chief Complaint: Consult (Left knee pain)    Physician:  No ref. provider found    HPI: Jaswinder Li is a 72 year old male who presents today for evaluation of his left knee.     Symptom Profile  Location of symptoms:  Medial > lateral joint line   Onset: insidious  Duration of symptoms: multiple years   Quality of symptoms: aching and sharp/stabbing   Severity:severe  Alleviate:activity modification   Exacerbating: activities   Previous Treatments: Previous treatments include activity modification, oral pain medication, multiple intra-articular steroid injection.     MEDICAL HISTORY: No past medical history on file.  BPH  Basal cell CA, tx    Pertinent negatives:  Patient has no history of DVT or PE. Discussed risk factors.    Medications:     Current Outpatient Prescriptions:      IBUPROFEN PO, , Disp: , Rfl:      aspirin 325 MG EC tablet, Take  by mouth daily., Disp: , Rfl:      terazosin (HYTRIN) 2 MG capsule, Take  by mouth At Bedtime., Disp: , Rfl:     Allergies: Review of patient's allergies indicates no known allergies.    SURGICAL HISTORY: No past surgical history on file.   Right knee uni doing very well. 2007  Bilateral CMC arthroplasties     FAMILY HISTORY: No family history on file.    SOCIAL HISTORY:   Social History   Substance Use Topics     Smoking status: Former Smoker     Types: Cigarettes     Smokeless tobacco: Never Used     Alcohol use Not on file   working, repairs fitness equiptment    REVIEW OF SYSTEMS:  The comprehensive review of systems from the intake form was reviewed with the patient.  No fever, weight change or fatigue. No dry eyes. No oral ulcers, sore throat or voice change. No palpitations, syncope, angina or edema.  No chest pain, excessive sleepiness, shortness of breath or hemoptysis.   No abdominal pain, nausea, vomiting, diarrhea or heartburn.  No skin rash. No focal weakness or numbness. No bleeding or lymphadenopathy. No rhinitis or hives.     Exam:  On physical  examination the patient appears the stated age, is in no acute distress, affectThe is appropriate, and breathing is non-labored.  Vitals are documented in the EMR and have been reviewed:    /76 (BP Location: Left arm)  Pulse 82  SpO2 94%  Data Unavailable  There is no height or weight on file to calculate BMI.    Rises from chair: with some effort   Gait: antalgic gait favoring the left side   Gains the exam table: with effort  Knee is benign in appearance without redness or effusion  ROM is , ligament examination is stable  Distally, the circulatory, motor, and sensation exam is intact with 5/5 EHL, gastroc-soleus, and tibialis anterior.  Sensation to light touch is intact.  Dorsalis pedis and posterior tibialis pulses are palpable.  There are no sores on the feet, no bruising, and no lymphedema.    X-rays:   Final Report   EXAM: XR KNEE LT 3 VW 1/24/2018 11:49 AM     HISTORY: ; Pain in joint, lower leg    COMPARISON: Outside study 5/10/2016    FINDINGS: Standing AP, lateral, and patellofemoral views of the right  knee.    No acute osseous abnormality.    Moderate severe medial compartment joint space loss, similar to prior  outside study. No patellar tilt or subluxation. Soft tissues  unremarkable.     IMPRESSION: Moderate severe medial compartment joint space loss.    NEVIN YEUNG MD (Joe)  Principal   Name: NEVIN YEUNG  Provider ID: [id number]      Assessment: This is a 72 year old with severe knee pain interfering with activities of daily living associated with advanced knee osteoarthritis with complete loss of the joint space. He has had comprehensive non-operative management including activity modification, oral pain medication, and multiple intra-articular injections. We discussed the treatment options including living with it and total knee. We discussed that he may not be the best candidate for a uni like he has on the right because of the shift of the femur on the tibia.  We spent approximately 20 minutes discussing the risks and benefits of total knee arthroplasty.  We reviewed the proposed surgical plan.  The discussion included but was not limited to the following:  Blood clots, blood clots to the lungs, injury to blood vessels and nerves, the literature as it pertains to known problems with leg length discrepancy, stiffness, anterior knee pain, and infection. We discussed the post-operative recovery for the typical patient, return to driving, and return to normal activities.  All the patient's questions were answered to the best of my ability and would like to proceed.    Plan:  Left total knee

## 2018-02-09 ENCOUNTER — TRANSFERRED RECORDS (OUTPATIENT)
Dept: HEALTH INFORMATION MANAGEMENT | Facility: CLINIC | Age: 73
End: 2018-02-09

## 2018-02-09 NOTE — TELEPHONE ENCOUNTER
PENDING SURGERY ORDERS FROM DR. BRADSHAW    Date Scheduled: 2-21-18  Facility: [facility]  Surgeon: Dr. Bradshaw   Post-op appointment scheduled:    Mar 06, 2018  8:30 AM CST   Nurse Only with MG NURSE ONLY ORTHO   [facility])    [address] [place] [address] [phone]            Mar 20, 2018  8:30 AM CDT   Return Visit with Branden Bradshaw MD   [facility])    [address] [place] [address] [phone]                 scheduled?: No  Surgery packet/instructions confirmed received?  Yes  Special Considerations:   Yolanda Varela, Surgery Scheduling Coordinator

## 2018-02-13 ENCOUNTER — OFFICE VISIT (OUTPATIENT)
Dept: PEDIATRICS | Facility: CLINIC | Age: 73
End: 2018-02-13
Payer: COMMERCIAL

## 2018-02-13 VITALS
HEART RATE: 73 BPM | TEMPERATURE: 97 F | HEIGHT: 66 IN | SYSTOLIC BLOOD PRESSURE: 116 MMHG | BODY MASS INDEX: 27.37 KG/M2 | WEIGHT: 170.3 LBS | DIASTOLIC BLOOD PRESSURE: 70 MMHG | OXYGEN SATURATION: 97 %

## 2018-02-13 DIAGNOSIS — N40.0 BENIGN PROSTATIC HYPERPLASIA WITHOUT LOWER URINARY TRACT SYMPTOMS: ICD-10-CM

## 2018-02-13 DIAGNOSIS — M17.12 PRIMARY OSTEOARTHRITIS OF LEFT KNEE: ICD-10-CM

## 2018-02-13 DIAGNOSIS — M25.562 LEFT KNEE PAIN, UNSPECIFIED CHRONICITY: ICD-10-CM

## 2018-02-13 DIAGNOSIS — Z01.818 PREOP GENERAL PHYSICAL EXAM: Primary | ICD-10-CM

## 2018-02-13 DIAGNOSIS — Z85.828 HX OF SKIN CANCER, BASAL CELL: ICD-10-CM

## 2018-02-13 DIAGNOSIS — Z23 NEED FOR TDAP VACCINATION: ICD-10-CM

## 2018-02-13 LAB
ANION GAP SERPL CALCULATED.3IONS-SCNC: 5 MMOL/L (ref 3–14)
BASOPHILS # BLD AUTO: 0 10E9/L (ref 0–0.2)
BASOPHILS NFR BLD AUTO: 0.5 %
BUN SERPL-MCNC: 21 MG/DL (ref 7–30)
CALCIUM SERPL-MCNC: 8.9 MG/DL (ref 8.5–10.1)
CHLORIDE SERPL-SCNC: 108 MMOL/L (ref 94–109)
CO2 SERPL-SCNC: 27 MMOL/L (ref 20–32)
CREAT SERPL-MCNC: 0.87 MG/DL (ref 0.66–1.25)
DIFFERENTIAL METHOD BLD: NORMAL
EOSINOPHIL # BLD AUTO: 0.1 10E9/L (ref 0–0.7)
EOSINOPHIL NFR BLD AUTO: 1.3 %
ERYTHROCYTE [DISTWIDTH] IN BLOOD BY AUTOMATED COUNT: 13.3 % (ref 10–15)
GFR SERPL CREATININE-BSD FRML MDRD: 86 ML/MIN/1.7M2
GLUCOSE SERPL-MCNC: 91 MG/DL (ref 70–99)
HCT VFR BLD AUTO: 45.2 % (ref 40–53)
HGB BLD-MCNC: 14.7 G/DL (ref 13.3–17.7)
IMM GRANULOCYTES # BLD: 0 10E9/L (ref 0–0.4)
IMM GRANULOCYTES NFR BLD: 0.5 %
LYMPHOCYTES # BLD AUTO: 2.1 10E9/L (ref 0.8–5.3)
LYMPHOCYTES NFR BLD AUTO: 34.2 %
MCH RBC QN AUTO: 30.1 PG (ref 26.5–33)
MCHC RBC AUTO-ENTMCNC: 32.5 G/DL (ref 31.5–36.5)
MCV RBC AUTO: 93 FL (ref 78–100)
MONOCYTES # BLD AUTO: 0.6 10E9/L (ref 0–1.3)
MONOCYTES NFR BLD AUTO: 10.2 %
NEUTROPHILS # BLD AUTO: 3.2 10E9/L (ref 1.6–8.3)
NEUTROPHILS NFR BLD AUTO: 53.3 %
PLATELET # BLD AUTO: 203 10E9/L (ref 150–450)
POTASSIUM SERPL-SCNC: 3.8 MMOL/L (ref 3.4–5.3)
RBC # BLD AUTO: 4.88 10E12/L (ref 4.4–5.9)
SODIUM SERPL-SCNC: 140 MMOL/L (ref 133–144)
WBC # BLD AUTO: 6 10E9/L (ref 4–11)

## 2018-02-13 PROCEDURE — 85025 COMPLETE CBC W/AUTO DIFF WBC: CPT | Performed by: FAMILY MEDICINE

## 2018-02-13 PROCEDURE — 86850 RBC ANTIBODY SCREEN: CPT | Performed by: ORTHOPAEDIC SURGERY

## 2018-02-13 PROCEDURE — 99204 OFFICE O/P NEW MOD 45 MIN: CPT | Mod: 25 | Performed by: FAMILY MEDICINE

## 2018-02-13 PROCEDURE — 36415 COLL VENOUS BLD VENIPUNCTURE: CPT | Performed by: FAMILY MEDICINE

## 2018-02-13 PROCEDURE — 80048 BASIC METABOLIC PNL TOTAL CA: CPT | Performed by: FAMILY MEDICINE

## 2018-02-13 PROCEDURE — 93000 ELECTROCARDIOGRAM COMPLETE: CPT | Performed by: FAMILY MEDICINE

## 2018-02-13 PROCEDURE — 90715 TDAP VACCINE 7 YRS/> IM: CPT | Performed by: FAMILY MEDICINE

## 2018-02-13 PROCEDURE — 86900 BLOOD TYPING SEROLOGIC ABO: CPT | Performed by: ORTHOPAEDIC SURGERY

## 2018-02-13 PROCEDURE — 90471 IMMUNIZATION ADMIN: CPT | Performed by: FAMILY MEDICINE

## 2018-02-13 PROCEDURE — 86901 BLOOD TYPING SEROLOGIC RH(D): CPT | Performed by: ORTHOPAEDIC SURGERY

## 2018-02-13 ASSESSMENT — PAIN SCALES - GENERAL: PAINLEVEL: MODERATE PAIN (5)

## 2018-02-13 NOTE — MR AVS SNAPSHOT
After Visit Summary   2/13/2018    Jaswinder Li    MRN: [id number]           Patient Information     Date Of Birth          1945        Visit Information        Provider Department      2/13/2018 11:10 AM Durga Joseph MD [facility]        Today's Diagnoses     Preop general physical exam    -  1    Primary osteoarthritis of left knee        Need for Tdap vaccination          Care Instructions    Get the TDAP shot today  Get the labs today  Hold aspirin and Ibuprofen for at least one week before surgery    Before Your Surgery      Call your surgeon if there is any change in your health. This includes signs of a cold or flu (such as a sore throat, runny nose, cough, rash or fever).    Do not smoke, drink alcohol or take over the counter medicine (unless your surgeon or primary care doctor tells you to) for the 24 hours before and after surgery.    If you take prescribed drugs: Follow your doctor s orders about which medicines to take and which to stop until after surgery.    Eating and drinking prior to surgery: follow the instructions from your surgeon    Take a shower or bath the night before surgery. Use the soap your surgeon gave you to gently clean your skin. If you do not have soap from your surgeon, use your regular soap. Do not shave or scrub the surgery site.  Wear clean pajamas and have clean sheets on your bed.           Follow-ups after your visit        Your next 10 appointments already scheduled     Mar 06, 2018  8:30 AM CST   Nurse Only with MG NURSE ONLY ORTHO   [facility])    [id number] [place] [address] [phone]            Mar 20, 2018  8:30 AM CDT   Return Visit with Branden Fernandez MD   [facility])    [address] [place] [address] [phone]              Who to contact     If you have questions or need follow up  "information about today's clinic visit or your schedule please contact [facility] directly at [phone].  Normal or non-critical lab and imaging results will be communicated to you by [employer], letter or phone within 4 business days after the clinic has received the results. If you do not hear from us within 7 days, please contact the clinic through [employer] or phone. If you have a critical or abnormal lab result, we will notify you by phone as soon as possible.  Submit refill requests through [employer] or call your pharmacy and they will forward the refill request to us. Please allow 3 business days for your refill to be completed.          Additional Information About Your Visit        [employer] Information     [employer] is an electronic gateway that provides easy, online access to your medical records. With [employer], you can request a clinic appointment, read your test results, renew a prescription or communicate with your care team.     To sign up for [employer] visit the website at [email or web address]   You will be asked to enter the access code listed below, as well as some personal information. Please follow the directions to create your username and password.     Your access code is: [email or web address]  Expires: 2018 11:34 AM     Your access code will  in 90 days. If you need help or a new code, please contact your [facility] or call [phone] for assistance.        Care EveryWhere ID     This is your Care EveryWhere ID. This could be used by other organizations to access your [facility] medical records  [id number]        Your Vitals Were     Pulse Temperature Height Pulse Oximetry BMI (Body Mass Index)       73 97  F (36.1  C) (Oral) 5' 6\" (1.676 m) 97% 27.49 kg/m2        Blood Pressure from Last 3 Encounters:   18 116/70   18 124/76   18 116/71    Weight from Last 3 Encounters:   18 170 lb 4.8 oz (77.2 kg)   18 172 lb " 14.4 oz (78.4 kg)   03/03/11 170 lb (77.1 kg)              We Performed the Following     Basic metabolic panel  (Ca, Cl, CO2, Creat, Gluc, K, Na, BUN)     CBC with platelets and differential     EKG 12-lead complete w/read - Clinics     TDAP VACCINE (ADACEL)        Primary Care Provider Fax #    Provider Not In System [phone]                Equal Access to Services     [facility] : Hadii aad ku hadasho Soomaali, waaxda luqadaha, qaybta kaalmada adeegyada, waxay idiin hayaan adeeg teresa laDanielkaren . So [facility] [phone].    ATENCIÓN: Si habla español, tiene a de la rosa disposición servicios gratuitos de asistencia lingüística. Llame al [phone].    We comply with applicable federal civil rights laws and [place] laws. We do not discriminate on the basis of race, color, national origin, age, disability, sex, sexual orientation, or gender identity.            Thank you!     Thank you for choosing [facility]  for your care. Our goal is always to provide you with excellent care. Hearing back from our patients is one way we can continue to improve our services. Please take a few minutes to complete the written survey that you may receive in the mail after your visit with us. Thank you!             Your Updated Medication List - Protect others around you: Learn how to safely use, store and throw away your medicines at www.disposemymeds.org.          This list is accurate as of 2/13/18 11:51 AM.  Always use your most recent med list.                   Brand Name Dispense Instructions for use Diagnosis    aspirin 325 MG EC tablet      Take  by mouth daily.        IBUPROFEN PO      Take 400-600 mg by mouth daily as needed for moderate pain        terazosin 2 MG capsule    HYTRIN     Take  by mouth At Bedtime.

## 2018-02-13 NOTE — NURSING NOTE
"Chief Complaint   Patient presents with     Pre-Op Exam       Initial /70 (BP Location: Right arm, Patient Position: Sitting, Cuff Size: Adult Large)  Pulse 73  Temp 97  F (36.1  C) (Oral)  Ht 5' 6\" (1.676 m)  Wt 170 lb 4.8 oz (77.2 kg)  SpO2 97%  BMI 27.49 kg/m2 Estimated body mass index is 27.49 kg/(m^2) as calculated from the following:    Height as of this encounter: 5' 6\" (1.676 m).    Weight as of this encounter: 170 lb 4.8 oz (77.2 kg).  Medication Reconciliation: complete  Manolo Christian, FABI    "

## 2018-02-13 NOTE — PATIENT INSTRUCTIONS
Get the TDAP shot today  Get the labs today  Hold aspirin and Ibuprofen for at least one week before surgery    Before Your Surgery      Call your surgeon if there is any change in your health. This includes signs of a cold or flu (such as a sore throat, runny nose, cough, rash or fever).    Do not smoke, drink alcohol or take over the counter medicine (unless your surgeon or primary care doctor tells you to) for the 24 hours before and after surgery.    If you take prescribed drugs: Follow your doctor s orders about which medicines to take and which to stop until after surgery.    Eating and drinking prior to surgery: follow the instructions from your surgeon    Take a shower or bath the night before surgery. Use the soap your surgeon gave you to gently clean your skin. If you do not have soap from your surgeon, use your regular soap. Do not shave or scrub the surgery site.  Wear clean pajamas and have clean sheets on your bed.

## 2018-02-13 NOTE — LETTER
February 13, 2018      Jaswinder Li                                                                [address] [facility] [address]          Dear Jaswinder,    The results of your recent pre operative labs were normal.     Please note that test explanations are brief and do not reflect all diagnostic uses.    Please make a follow-up appointment if you have additional questions.    Sincerely,       Durga Joseph MD/alc      Results for orders placed or performed in visit on 02/13/18   CBC with platelets and differential   Result Value Ref Range    WBC 6.0 4.0 - 11.0 10e9/L    RBC Count 4.88 4.4 - 5.9 10e12/L    Hemoglobin 14.7 13.3 - 17.7 g/dL    Hematocrit 45.2 40.0 - 53.0 %    MCV 93 78 - 100 fl    MCH 30.1 26.5 - 33.0 pg    MCHC 32.5 31.5 - 36.5 g/dL    RDW 13.3 10.0 - 15.0 %    Platelet Count 203 150 - 450 10e9/L    Diff Method Automated Method     % Neutrophils 53.3 %    % Lymphocytes 34.2 %    % Monocytes 10.2 %    % Eosinophils 1.3 %    % Basophils 0.5 %    % Immature Granulocytes 0.5 %    Absolute Neutrophil 3.2 1.6 - 8.3 10e9/L    Absolute Lymphocytes 2.1 0.8 - 5.3 10e9/L    Absolute Monocytes 0.6 0.0 - 1.3 10e9/L    Absolute Eosinophils 0.1 0.0 - 0.7 10e9/L    Absolute Basophils 0.0 0.0 - 0.2 10e9/L    Abs Immature Granulocytes 0.0 0 - 0.4 10e9/L   Basic metabolic panel  (Ca, Cl, CO2, Creat, Gluc, K, Na, BUN)   Result Value Ref Range    Sodium 140 133 - 144 mmol/L    Potassium 3.8 3.4 - 5.3 mmol/L    Chloride 108 94 - 109 mmol/L    Carbon Dioxide 27 20 - 32 mmol/L    Anion Gap 5 3 - 14 mmol/L    Glucose 91 70 - 99 mg/dL    Urea Nitrogen 21 7 - 30 mg/dL    Creatinine 0.87 0.66 - 1.25 mg/dL    GFR Estimate 86 >60 mL/min/1.7m2    GFR Estimate If Black >90 >60 mL/min/1.7m2    Calcium 8.9 8.5 - 10.1 mg/dL

## 2018-02-14 ENCOUNTER — HOSPITAL ENCOUNTER (INPATIENT)
Facility: CLINIC | Age: 73
Setting detail: SURGERY ADMIT
End: 2018-02-14
Attending: ORTHOPAEDIC SURGERY | Admitting: ORTHOPAEDIC SURGERY
Payer: COMMERCIAL

## 2018-02-15 NOTE — TELEPHONE ENCOUNTER
The patient had a procedure on his head and the sutures broke open. He saw the doctor and they stated that it would take him long to heal the wound. His doctor recommended that he delay his total knee surgery.    Date Scheduled: 3-7-18  Facility: [facility]  Surgeon: Dr. Fernandez   Post-op appointment scheduled:   Next 5 appointments (look out 90 days)     Mar 20, 2018  8:30 AM CDT   Nurse Only with MG NURSE ONLY ORTHO   [facility])    [address] [place] [address] [phone]            Apr 17, 2018  8:30 AM CDT   Return Visit with Branden Fernandez MD   [facility])    [address] [place] [address] [phone]                   scheduled?: No  Surgery packet/instructions confirmed received?  Yes  Special Considerations:   Yolanda Varela, Surgery Scheduling Coordinator

## 2018-02-16 LAB
ABO + RH BLD: NORMAL
BLD GP AB SCN SERPL QL: NORMAL
BLD GP AB SCN SERPL QL: NORMAL
BLOOD BANK CMNT PATIENT-IMP: NORMAL
SPECIMEN EXP DATE BLD: NORMAL
SPECIMEN EXP DATE BLD: NORMAL

## 2018-02-21 ENCOUNTER — TELEPHONE (OUTPATIENT)
Dept: ORTHOPEDICS | Facility: CLINIC | Age: 73
End: 2018-02-21

## 2018-02-21 NOTE — TELEPHONE ENCOUNTER
[facility]    Phone Message: Jaswinder  Best Contact: [phone]    May a detailed message be left on voicemail: yes    Reason for Call: Jaswinder called and would like to speak to a member of the Care Team regarding his upcoming surgery.  He is looking to discuss a partial knee replacement versus a full.  Please advise.  Thank you.     Action Taken: Message routed to:  Adult Clinics: Orthopedics p [phone]

## 2018-02-26 NOTE — TELEPHONE ENCOUNTER
Luis Antonio is calling with questions regarding his upcoming surgery on 3/7/18.  Requesting a call back.  [phone].  Thank you.

## 2018-02-26 NOTE — TELEPHONE ENCOUNTER
"Per huddle with Dr. Fernandez, he discussed this with the patient at their appt, and he \"may not be the best candidate for a uni like he has on the right because of the shift of the femur on the tibia.\"  Although Dr. Fernandez does partial knee replacements, he does not believe Pt is a candidate for one and is not willing to do a partial knee replacement on him.     Discussed with Pt, he stated that he will have to think it over and decide what he would like to do. He will contact us with any further questions or to update on plan.    Chris Covarrubias RN   "

## 2018-02-28 NOTE — TELEPHONE ENCOUNTER
[facility] approved surgery for a TKA. Auth # [id number]. Per Elizabeth [phone] ext. [phone]    Yolanda Varela, Surgery Scheduling Coordinator

## 2018-02-28 NOTE — TELEPHONE ENCOUNTER
The patient cancelled the surgery and is switching to a different provider so that he can have a partial knee replacement.  Yolanda Varela, Surgery Scheduling Coordinator

## 2020-03-13 ENCOUNTER — TRANSFERRED RECORDS (OUTPATIENT)
Dept: HEALTH INFORMATION MANAGEMENT | Facility: CLINIC | Age: 75
End: 2020-03-13

## 2020-05-22 ENCOUNTER — TRANSFERRED RECORDS (OUTPATIENT)
Dept: HEALTH INFORMATION MANAGEMENT | Facility: CLINIC | Age: 75
End: 2020-05-22

## 2020-06-02 ENCOUNTER — TRANSFERRED RECORDS (OUTPATIENT)
Dept: HEALTH INFORMATION MANAGEMENT | Facility: CLINIC | Age: 75
End: 2020-06-02

## 2020-06-10 ENCOUNTER — TRANSFERRED RECORDS (OUTPATIENT)
Dept: HEALTH INFORMATION MANAGEMENT | Facility: CLINIC | Age: 75
End: 2020-06-10

## 2020-06-22 NOTE — TELEPHONE ENCOUNTER
FUTURE VISIT INFORMATION      FUTURE VISIT INFORMATION:    Date: 6/30/20    Time: 8:00am    Location: [facility]  REFERRAL INFORMATION:    Ref[facility] providers [facility]    Reason for visit/diagnosis  LLE extropian    RECORDS REQUESTED FROM:       Clinic name Comments Records Status Imaging Status   [facility] Request for recs sent 6/22

## 2020-06-30 ENCOUNTER — OFFICE VISIT (OUTPATIENT)
Dept: OPHTHALMOLOGY | Facility: CLINIC | Age: 75
End: 2020-06-30
Payer: COMMERCIAL

## 2020-06-30 ENCOUNTER — PRE VISIT (OUTPATIENT)
Dept: OPHTHALMOLOGY | Facility: CLINIC | Age: 75
End: 2020-06-30

## 2020-06-30 ENCOUNTER — TELEPHONE (OUTPATIENT)
Dept: OPHTHALMOLOGY | Facility: CLINIC | Age: 75
End: 2020-06-30

## 2020-06-30 VITALS — HEIGHT: 66 IN | WEIGHT: 160 LBS | BODY MASS INDEX: 25.71 KG/M2

## 2020-06-30 DIAGNOSIS — H16.142 PUNCTATE KERATITIS OF LEFT EYE: ICD-10-CM

## 2020-06-30 DIAGNOSIS — H02.115 CICATRICIAL ECTROPION OF LEFT LOWER EYELID: Primary | ICD-10-CM

## 2020-06-30 DIAGNOSIS — D04.30 SQUAMOUS CELL CARCINOMA IN SITU (SCCIS) OF SKIN OF FACE: ICD-10-CM

## 2020-06-30 DIAGNOSIS — Z11.59 ENCOUNTER FOR SCREENING FOR OTHER VIRAL DISEASES: Primary | ICD-10-CM

## 2020-06-30 DIAGNOSIS — G51.0 FACIAL PARALYSIS: ICD-10-CM

## 2020-06-30 ASSESSMENT — LAGOPHTHALMOS
OD_LAGOPHTHALMOS: 0
OS_LAGOPHTHALMOS: 2

## 2020-06-30 ASSESSMENT — MARGIN REFLEX DISTANCE
OD_MRD1: 5
OD_MRD2: 4
OS_MRD1: 5
OS_MRD2: 6

## 2020-06-30 ASSESSMENT — TONOMETRY
OD_IOP_MMHG: 16
OS_IOP_MMHG: 17
IOP_METHOD: ICARE

## 2020-06-30 ASSESSMENT — VISUAL ACUITY
METHOD: SNELLEN - LINEAR
OS_SC+: -1
OD_SC: 20/50
OD_SC+: -2
OS_SC: 20/50

## 2020-06-30 ASSESSMENT — CONF VISUAL FIELD
METHOD: COUNTING FINGERS
OS_NORMAL: 1
OD_NORMAL: 1

## 2020-06-30 ASSESSMENT — EXTERNAL EXAM - RIGHT EYE: OD_EXAM: NORMAL

## 2020-06-30 ASSESSMENT — MIFFLIN-ST. JEOR: SCORE: 1413.51

## 2020-06-30 NOTE — PROGRESS NOTES
"     Chief Complaint(s) and History of Present Illness(es)     Consult For     Laterality: left eye    Onset: 6 weeks ago    Associated symptoms: dryness, redness and burning.  Negative for eye   pain, itching and discharge    Treatments tried: eye drops    Response to treatment: no improvement    Pain scale: 0/10    Comments: LLL ectropion              Comments     Jaswinder Li is being seen today by the request of Dr. Britt   for LLL ectropion.  Patient notes that he had sx on his face for removal   cancer and had a \"flap\" put on, noticed about 6 weeks ago, has been using   thera tears 6-7x daily for relief,  He has irritation and HA.  Patient   uses tammy at bedtime.   Elizabethjakub Fragoso COT June 30, 2020 8:00 AM         Jaswinder Li is a 75 year old who presents for LLL ectropion after squamous cell carcinoma excision followed by what appears to be an ALT flap application on the left cheek. He has had some issues with the vascular anastomosis site with extrusion of the vascular clips. He is having left eye pain, irritation, watering, and redness. He uses theratears 6-7x/day, and an ointment at night. He initially had no muscle function on the left side of his face, but now is regaining some function.      Assessment & Plan     Jaswinder Li is a 75 year old male with the following diagnoses:   Encounter Diagnoses   Name Primary?     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 Given this is improving will observe with continued lubrication but may require upper eyelid weight.         Dillon Masters MD  Ophthalmology PGY-2  [facility]      Attending Physician Attestation:  Complete documentation of " historical and exam elements from today's encounter can be found in the full encounter summary report (not reduplicated in this progress note).  I personally obtained the chief complaint(s) and history of present illness.  I confirmed and edited as necessary the review of systems, past medical/surgical history, family history, social history, and examination findings as documented by others; and I examined the patient myself.  I personally reviewed the relevant tests, images, and reports as documented above.  I formulated and edited as necessary the assessment and plan and discussed the findings and management plan with the patient and family. - Akhil Mendez MD

## 2020-06-30 NOTE — TELEPHONE ENCOUNTER
Spoke with patient to schedule surgery with Dr. Akhil Mendez.    Surgery was scheduled on 07/23 at [facility]  [facility]     Patient is aware they will receive a call to schedule a COVID-19 test before their procedure.   The test should be with-in 72hours of your procedure.     Post-Op visit was scheduled on 07/28  Patient is aware a / is needed day of surgery.   Surgery packet was mailed, patient has my direct contact information for any further questions.

## 2020-06-30 NOTE — LETTER
" 2020         RE:  :  MRN: Jaswinder Li  1945  [id number]     Dear Dr. Owens,    Thank you for asking me to see your patient, Jaswinder Li, for an oculoplastic   consultation.  My assessment and plan are below.  For further details, please see my attached clinic note.      Chief Complaint(s) and History of Present Illness(es)     Consult For     Laterality: left eye    Onset: 6 weeks ago    Associated symptoms: dryness, redness and burning.  Negative for eye   pain, itching and discharge    Treatments tried: eye drops    Response to treatment: no improvement    Pain scale: 0/10    Comments: LLL ectropion              Comments     Jaswinder Li is being seen today by the request of Dr. Britt   for LLL ectropion.  Patient notes that he had sx on his face for removal   cancer and had a \"flap\" put on, noticed about 6 weeks ago, has been using   thera tears 6-7x daily for relief,  He has irritation and HA.  Patient   uses tammy at bedtime.   Elizabeth Fragoso COT 2020 8:00 AM         Jaswinder Li is a 75 year old who presents for LLL ectropion after squamous cell carcinoma excision followed by what appears to be an ALT flap application on the left cheek. He has had some issues with the vascular anastomosis site with extrusion of the vascular clips. He is having left eye pain, irritation, watering, and redness. He uses theratears 6-7x/day, and an ointment at night. He initially had no muscle function on the left side of his face, but now is regaining some function.      Assessment & Plan     Jaswinder Li is a 75 year old male with the following diagnoses:   Encounter Diagnoses   Name Primary?     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 Given this is improving will observe with continued lubrication but may require upper eyelid weight.        Again, thank you for allowing me to participate in the care of your patient.      Sincerely,    Akhil Mendez MD  Department of Ophthalmology and Visual Neurosciences  [facility]    CC: Chante Reyes MD  VIA Facsimile: [phone]

## 2020-06-30 NOTE — NURSING NOTE
"Chief Complaints and History of Present Illnesses   Patient presents with     Consult For     LLL ectropion     Chief Complaint(s) and History of Present Illness(es)     Consult For     Laterality: left eye    Onset: 6 weeks ago    Associated symptoms: dryness, redness and burning.  Negative for eye pain, itching and discharge    Treatments tried: eye drops    Response to treatment: no improvement    Pain scale: 0/10    Comments: LLL ectropion              Comments     Jaswinder Li is being seen today by the request of Dr. Britt for LLL ectropion.  Patient notes that he had sx on his face for removal cancer and had a \"flap\" put on, noticed about 6 weeks ago, has been using thera tears 6-7x daily for relief,  He has irritation and HA.  Patient uses tammy at bedtime.   Elizabeth SALDIVAR June 30, 2020 8:00 AM                    "

## 2020-07-20 DIAGNOSIS — Z11.59 ENCOUNTER FOR SCREENING FOR OTHER VIRAL DISEASES: ICD-10-CM

## 2020-07-20 PROCEDURE — U0003 INFECTIOUS AGENT DETECTION BY NUCLEIC ACID (DNA OR RNA); SEVERE ACUTE RESPIRATORY SYNDROME CORONAVIRUS 2 (SARS-COV-2) (CORONAVIRUS DISEASE [COVID-19]), AMPLIFIED PROBE TECHNIQUE, MAKING USE OF HIGH THROUGHPUT TECHNOLOGIES AS DESCRIBED BY CMS-2020-01-R: HCPCS | Performed by: OPHTHALMOLOGY

## 2020-07-21 ENCOUNTER — DOCUMENTATION ONLY (OUTPATIENT)
Dept: CARE COORDINATION | Facility: CLINIC | Age: 75
End: 2020-07-21

## 2020-07-21 LAB
SARS-COV-2 RNA SPEC QL NAA+PROBE: NOT DETECTED
SPECIMEN SOURCE: NORMAL

## 2020-07-22 ENCOUNTER — ANESTHESIA EVENT (OUTPATIENT)
Dept: SURGERY | Facility: AMBULATORY SURGERY CENTER | Age: 75
End: 2020-07-22

## 2020-07-23 ENCOUNTER — HOSPITAL ENCOUNTER (OUTPATIENT)
Facility: AMBULATORY SURGERY CENTER | Age: 75
End: 2020-07-23
Attending: OPHTHALMOLOGY
Payer: COMMERCIAL

## 2020-07-23 ENCOUNTER — ANESTHESIA (OUTPATIENT)
Dept: SURGERY | Facility: AMBULATORY SURGERY CENTER | Age: 75
End: 2020-07-23

## 2020-07-23 VITALS
SYSTOLIC BLOOD PRESSURE: 118 MMHG | TEMPERATURE: 98 F | OXYGEN SATURATION: 98 % | HEART RATE: 74 BPM | DIASTOLIC BLOOD PRESSURE: 78 MMHG | RESPIRATION RATE: 16 BRPM

## 2020-07-23 DIAGNOSIS — H16.142 PUNCTATE KERATITIS OF LEFT EYE: ICD-10-CM

## 2020-07-23 DIAGNOSIS — G51.0 FACIAL PARALYSIS: ICD-10-CM

## 2020-07-23 DIAGNOSIS — D04.30 SQUAMOUS CELL CARCINOMA IN SITU (SCCIS) OF SKIN OF FACE: ICD-10-CM

## 2020-07-23 DIAGNOSIS — H02.115 CICATRICIAL ECTROPION OF LEFT LOWER EYELID: ICD-10-CM

## 2020-07-23 RX ORDER — ONDANSETRON 4 MG/1
4 TABLET, ORALLY DISINTEGRATING ORAL EVERY 30 MIN PRN
Status: DISCONTINUED | OUTPATIENT
Start: 2020-07-23 | End: 2020-07-24 | Stop reason: HOSPADM

## 2020-07-23 RX ORDER — ERYTHROMYCIN 5 MG/G
OINTMENT OPHTHALMIC
Qty: 3.5 G | Refills: 0 | Status: SHIPPED | OUTPATIENT
Start: 2020-07-23 | End: 2020-09-16

## 2020-07-23 RX ORDER — SODIUM CHLORIDE, SODIUM LACTATE, POTASSIUM CHLORIDE, CALCIUM CHLORIDE 600; 310; 30; 20 MG/100ML; MG/100ML; MG/100ML; MG/100ML
INJECTION, SOLUTION INTRAVENOUS CONTINUOUS
Status: DISCONTINUED | OUTPATIENT
Start: 2020-07-23 | End: 2020-07-23 | Stop reason: HOSPADM

## 2020-07-23 RX ORDER — OXYCODONE HYDROCHLORIDE 5 MG/1
5 TABLET ORAL EVERY 4 HOURS PRN
Status: DISCONTINUED | OUTPATIENT
Start: 2020-07-23 | End: 2020-07-24 | Stop reason: HOSPADM

## 2020-07-23 RX ORDER — LIDOCAINE 40 MG/G
CREAM TOPICAL
Status: DISCONTINUED | OUTPATIENT
Start: 2020-07-23 | End: 2020-07-23 | Stop reason: HOSPADM

## 2020-07-23 RX ORDER — SODIUM CHLORIDE, SODIUM LACTATE, POTASSIUM CHLORIDE, CALCIUM CHLORIDE 600; 310; 30; 20 MG/100ML; MG/100ML; MG/100ML; MG/100ML
INJECTION, SOLUTION INTRAVENOUS CONTINUOUS PRN
Status: DISCONTINUED | OUTPATIENT
Start: 2020-07-23 | End: 2020-07-23

## 2020-07-23 RX ORDER — ONDANSETRON 2 MG/ML
INJECTION INTRAMUSCULAR; INTRAVENOUS PRN
Status: DISCONTINUED | OUTPATIENT
Start: 2020-07-23 | End: 2020-07-23

## 2020-07-23 RX ORDER — BACITRACIN 500 [USP'U]/G
0.5 OINTMENT OPHTHALMIC 3 TIMES DAILY
Qty: 3.5 G | Refills: 0 | Status: SHIPPED | OUTPATIENT
Start: 2020-07-23 | End: 2020-07-30

## 2020-07-23 RX ORDER — PROPOFOL 10 MG/ML
INJECTION, EMULSION INTRAVENOUS PRN
Status: DISCONTINUED | OUTPATIENT
Start: 2020-07-23 | End: 2020-07-23

## 2020-07-23 RX ORDER — TETRACAINE HYDROCHLORIDE 5 MG/ML
SOLUTION OPHTHALMIC PRN
Status: DISCONTINUED | OUTPATIENT
Start: 2020-07-23 | End: 2020-07-23 | Stop reason: HOSPADM

## 2020-07-23 RX ORDER — NALOXONE HYDROCHLORIDE 0.4 MG/ML
.1-.4 INJECTION, SOLUTION INTRAMUSCULAR; INTRAVENOUS; SUBCUTANEOUS
Status: DISCONTINUED | OUTPATIENT
Start: 2020-07-23 | End: 2020-07-24 | Stop reason: HOSPADM

## 2020-07-23 RX ORDER — FENTANYL CITRATE 50 UG/ML
25-50 INJECTION, SOLUTION INTRAMUSCULAR; INTRAVENOUS
Status: DISCONTINUED | OUTPATIENT
Start: 2020-07-23 | End: 2020-07-23 | Stop reason: HOSPADM

## 2020-07-23 RX ORDER — FENTANYL CITRATE 50 UG/ML
INJECTION, SOLUTION INTRAMUSCULAR; INTRAVENOUS PRN
Status: DISCONTINUED | OUTPATIENT
Start: 2020-07-23 | End: 2020-07-23

## 2020-07-23 RX ORDER — MEPERIDINE HYDROCHLORIDE 25 MG/ML
12.5 INJECTION INTRAMUSCULAR; INTRAVENOUS; SUBCUTANEOUS
Status: DISCONTINUED | OUTPATIENT
Start: 2020-07-23 | End: 2020-07-24 | Stop reason: HOSPADM

## 2020-07-23 RX ORDER — ONDANSETRON 2 MG/ML
4 INJECTION INTRAMUSCULAR; INTRAVENOUS EVERY 30 MIN PRN
Status: DISCONTINUED | OUTPATIENT
Start: 2020-07-23 | End: 2020-07-24 | Stop reason: HOSPADM

## 2020-07-23 RX ORDER — ERYTHROMYCIN 5 MG/G
OINTMENT OPHTHALMIC PRN
Status: DISCONTINUED | OUTPATIENT
Start: 2020-07-23 | End: 2020-07-23 | Stop reason: HOSPADM

## 2020-07-23 RX ORDER — LIDOCAINE HYDROCHLORIDE 20 MG/ML
INJECTION, SOLUTION INFILTRATION; PERINEURAL PRN
Status: DISCONTINUED | OUTPATIENT
Start: 2020-07-23 | End: 2020-07-23

## 2020-07-23 RX ORDER — ACETAMINOPHEN 325 MG/1
975 TABLET ORAL ONCE
Status: COMPLETED | OUTPATIENT
Start: 2020-07-23 | End: 2020-07-23

## 2020-07-23 RX ORDER — SODIUM CHLORIDE, SODIUM LACTATE, POTASSIUM CHLORIDE, CALCIUM CHLORIDE 600; 310; 30; 20 MG/100ML; MG/100ML; MG/100ML; MG/100ML
INJECTION, SOLUTION INTRAVENOUS CONTINUOUS
Status: DISCONTINUED | OUTPATIENT
Start: 2020-07-23 | End: 2020-07-24 | Stop reason: HOSPADM

## 2020-07-23 RX ADMIN — ONDANSETRON 4 MG: 2 INJECTION INTRAMUSCULAR; INTRAVENOUS at 12:38

## 2020-07-23 RX ADMIN — ACETAMINOPHEN 975 MG: 325 TABLET ORAL at 13:35

## 2020-07-23 RX ADMIN — FENTANYL CITRATE 100 MCG: 50 INJECTION, SOLUTION INTRAMUSCULAR; INTRAVENOUS at 12:27

## 2020-07-23 RX ADMIN — PROPOFOL 40 MG: 10 INJECTION, EMULSION INTRAVENOUS at 12:30

## 2020-07-23 RX ADMIN — LIDOCAINE HYDROCHLORIDE 40 MG: 20 INJECTION, SOLUTION INFILTRATION; PERINEURAL at 12:30

## 2020-07-23 RX ADMIN — SODIUM CHLORIDE, SODIUM LACTATE, POTASSIUM CHLORIDE, CALCIUM CHLORIDE: 600; 310; 30; 20 INJECTION, SOLUTION INTRAVENOUS at 12:22

## 2020-07-23 RX ADMIN — PROPOFOL 20 MG: 10 INJECTION, EMULSION INTRAVENOUS at 12:44

## 2020-07-23 NOTE — OP NOTE
PREOPERATIVE DIAGNOSIS: Left lower eyelid cicatricial ectropion.   POSTOPERATIVE DIAGNOSIS: Left lower eyelid ciatricial ectropion.   PROCEDURE: Left  lower eyelid ectropion repair with full-thickness skin graft (right retroauricular area donor site) and temporary tarsorrhaphy.   SURGEON: Akhil Mendez MD   ASSISTANT: Kira Dodson MD     ANESTHESIA: Monitored with local infiltration, 50/50 mixture of 2% lidocaine with epinephrine and 0.5% Marcaine.   COMPLICATIONS: None.   ESTIMATED BLOOD LOSS: Less than 5 mL.   HISTORY: Jaswinder Li  presented with a severe cicatricial ectropion of the left lower lid due to cicatricial as well as involutional changes. After the risks, benefits and alternatives to the proposed procedure were explained, informed consent was obtained.   DESCRIPTION OF PROCEDURE: Jaswinder Li  was brought to the operating room and placed supine on the operating table. IV sedation was given. The left  lower lid and lateral canthal area were infiltrated with local anesthetic. The left upper lid was also infiltrated as was the right retroauricular area. The area was prepped and draped in the typical sterile fashion for oculoplastic surgery. Attention was directed to the left side. A subciliary incision was made with a 15 blade and cicatricial forces released with the Mayito scissors. Hemostasis was obtained. The lateral tarsus was secured to the lateral orbital rim periosteum with a 5-0 Vicryl suture. The skin deficit was marked on a template. This was placed in the preauricular space where an ellipse was drawn around it. The retroauricular skin was incised with a #15 blade. The graft was excised with a Mayito scissors. Hemostasis was obtained. The donor site was closed with interrupted buried 5-0 Vicryl sutures to close the deep tissues. Skin was closed with running 6-0 chromic gut suture. The graft was trimmed to fit in the lower lid defect. It was secured with interrupted 6-0 plain  gut sutures in the cardinal positions and running 6-0 plain gut suture superiorly and inferiorly. A 5-0 Prolene suture was placed to the lid margin and lower eyelid. The double-armed suture was then brought through the upper eyelid margin as a temporary tarsorrhaphy. A bolster dressing of Telfa and dental rolls was then placed over the graft. The Prolene suture was then secured over the bolster in a horizontal mattress fashion. Another suture was placed medially. Jaswinder Li  tolerated the procedure well. Antibiotic ointment was applied and Jaswinder Li  left the operating room in stable condition.   Akhil Mendez MD

## 2020-07-23 NOTE — ANESTHESIA PREPROCEDURE EVALUATION
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 [D04.30]   Procedure(s):  Skin graft from behind right ear to left lower eyelid  Left lower eyelid cicatricial ectropion repair with skin graft and temporary tarsorhaphy     LABS:  CBC:   Lab Results   Component Value Date    WBC 6.0 2018    HGB 14.7 2018    HGB 13.7 2007    HCT 45.2 2018     2018     BMP:   Lab Results   Component Value Date     2018     2007    POTASSIUM 3.8 2018    POTASSIUM 3.9 2007    CHLORIDE 108 2018    CHLORIDE 106 2007    CO2 27 2018    CO2 25 2007    BUN 21 2018    BUN 14 2007    CR 0.87 2018    CR 0.90 2007    GLC 91 2018     (H) 2007     COAGS:   Lab Results   Component Value Date    INR 1.32 (H) 2007     POC: No results found for: BGM, HCG, HCGS  OTHER:   Lab Results   Component Value Date    ERAN 8.9 2018        Preop Vitals    BP Readings from Last 3 Encounters:   20 121/76   18 116/70   18 124/76    Pulse Readings from Last 3 Encounters:   20 75   18 73   18 82      Resp Readings from Last 3 Encounters:   20 16    SpO2 Readings from Last 3 Encounters:   20 96%   18 97%   18 94%      Temp Readings from Last 1 Encounters:   20 36.5  C (97.7  F) (Temporal)    Ht Readings from Last 1 Encounters:   20 1.676 m (5' 6\")      Wt Readings from Last 1 Encounters:   20 72.6 kg (160 lb)    Estimated body mass index is 25.82 kg/m  as calculated from the following:    Height as of 20: 1.676 m (5' 6\").    Weight as of " 6/30/20: 72.6 kg (160 lb).     LDA:  Peripheral IV 07/23/20 Right Lower forearm (Active)   Site Assessment WDL 07/23/20 1056   Line Status Infusing 07/23/20 1056   Phlebitis Scale 0-->no symptoms 07/23/20 1056   Infiltration Scale 0 07/23/20 1056   Number of days: 0        Past Medical History:   Diagnosis Date     BPH (benign prostatic hyperplasia)      Osteoarthritis of left knee       Past Surgical History:   Procedure Laterality Date     basal cell       right knee tka       thumb surgery       TONSILLECTOMY        No Known Allergies     Anesthesia Evaluation     .             ROS/MED HX    ENT/Pulmonary: Comment: Facial Paralysis - neg pulmonary ROS     Neurologic:  - neg neurologic ROS     Cardiovascular:  - neg cardiovascular ROS       METS/Exercise Tolerance:     Hematologic:  - neg hematologic  ROS       Musculoskeletal:  - neg musculoskeletal ROS (+) arthritis,  -       GI/Hepatic:  - neg GI/hepatic ROS       Renal/Genitourinary:  - ROS Renal section negative       Endo:  - neg endo ROS       Psychiatric:  - neg psychiatric ROS       Infectious Disease:  - neg infectious disease ROS       Malignancy:   (+) Malignancy History of Skin       - no malignancy   Other:    - neg other ROS                     PHYSICAL EXAM:   Mental Status/Neuro: A/A/O   Airway: Facies: Feasible  Mallampati: I  Mouth/Opening: Full  TM distance: > 6 cm  Neck ROM: Full   Respiratory: Auscultation: CTAB     Resp. Rate: Normal     Resp. Effort: Normal      CV: Rhythm: Regular  Rate: Age appropriate  Heart: Normal Sounds  Edema: None   Comments:      Dental: Normal Dentition                Assessment:   ASA SCORE: 2    H&P: History and physical reviewed and following examination; no interval change.   Smoking Status:  Non-Smoker/Unknown   NPO Status: NPO Appropriate     Plan:   Anes. Type:  MAC   Pre-Medication: None   Induction:  N/a   Airway: Native Airway   Access/Monitoring: PIV   Maintenance: Propofol Sedation     Postop Plan:    Postop Pain: None  Postop Sedation/Airway: Not planned  Disposition: Outpatient     PONV Management:   Adult Risk Factors:, Non-Smoker   Prevention: Ondansetron, Propofol     CONSENT: Direct conversation   Plan and risks discussed with: Patient   Blood Products: Consent Deferred (Minimal Blood Loss)                   Geraldo Quintana MD

## 2020-07-23 NOTE — ANESTHESIA POSTPROCEDURE EVALUATION
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     Anesthesia Type: MAC          Postop Pain Control: Uneventful            Sign Out: Well controlled pain   PONV: No   Neuro/Psych: Uneventful            Sign Out: Acceptable/Baseline neuro status   Airway/Respiratory: Uneventful            Sign Out: Acceptable/Baseline resp. status   CV/Hemodynamics: Uneventful            Sign Out: Acceptable CV status   Other NRE: NONE   DID A NON-ROUTINE EVENT OCCUR? No         Last Anesthesia Record Vitals:  CRNA VITALS  2020 1249 - 2020 1349      2020             Resp Rate (set):  10          Last PACU Vitals:  Vitals Value Taken Time   /75 2020  1:20 PM   Temp 36.7  C (98  F) 2020  1:20 PM   Pulse 76 2020  1:20 PM   Resp 16 2020  1:20 PM   SpO2 97 % 2020  1:20 PM   Temp src Available 2020  1:15 PM   NIBP 116/72 2020  1:13 PM   Pulse 87 2020  1:17 PM   SpO2 97 % 2020  1:17 PM   Resp     Temp     Ht Rate 83 2020  1:16 PM   Temp 2           Electronically Signed By: Geraldo Quintana MD, 2020, 3:26 PM

## 2020-07-23 NOTE — BRIEF OP NOTE
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

## 2020-07-23 NOTE — DISCHARGE INSTRUCTIONS
Post-operative Instructions  Ophthalmic Plastic and Reconstructive Surgery    Akhil Mendez M.D.     All instructions apply to the operated eye(s) or eyelid(s).    Wound care and personal care  ? If a patch or bandage has been placed, please leave this in place until seen by your physician. Ensure that the bandage does not get wet when you take a shower.  ? As long as there is no further bleeding, after two days, warm water compresses for five minutes, four times a day until seen by your physician.   ? You may shower or wash your hair the day after surgery. Do not go swimming for at least 2 weeks to prevent contamination of your wounds.  ? Do not apply make-up to the eyes or eyelids for 2 weeks after surgery.  ? Expect some swelling, bruising, black eye (even into the lower eyelids and cheeks). Also expect serum caking, crusting and discharge from the eye and/or incisions. A small amount of surface bleeding, and depending on the type of surgery, bleeding from the inside of the eyelid, is normal for the first 48 hours.  ? Avoid straining, bending at the waist, or lifting more than 15 pounds for 10 days. Activities that raise your blood pressure can worsen swelling, cause bleeding, and breaking of sutures. Like wise, sleeping with your head slightly elevated for the first several days can help swelling resolve more quickly.   ? Do continue to ambulate (walk) as you normally would - being sedentary after surgery can cause blood clots.   ? Your eye(s) and eyelid(s) may be painful and tender. This is normal after surgery.      Contact information and follow-up  ? Return to the Eye Clinic for a follow-up appointment with your physician as scheduled. If no appointment has been scheduled:   - [facility] eye clinic: [phone] for an appointment with Dr. Mendez within 1 to 2 weeks from your date of surgery.   -  [facility] eye clinic: [phone] for an appointment with Dr. Mendez  within 1 to 2 weeks from your date of surgery.     ? For severe pain, bleeding, or loss of vision, call the [facility] at [phone] or [facility] at [phone].     After hours or on weekends and holidays, call [phone] and ask to speak with the ophthalmologist on call.    An on call person can be reached after hours for concerns. The on call doctor should not call in medication refill requests after hours or on weekends, so please plan accordingly. An effort has been made to provide adequate pain medications following every surgery, and refills will not be provided in most instances. Narcotic pain medications cannot be called in.     Activity restrictions and driving  ? Avoid heavy lifting, bending, exercise or strenuous activity for 1 week after surgery.  You may resume other activities and return to work as tolerated.  ? You may not resume driving if you are using narcotic pain medications (such as Norco, Percocet, Tylenol #3).    Medications  ? Restart all regular home medications and eye drops. If you take Plavix or  Aspirin on a regular basis, wait for 72 hours after your surgery before restarting these in order to decrease the risk of bleeding complications.  ? Avoid aspirin and aspirin-like medications (Motrin, Aleve, Ibuprofen, Debbie-Cherry Log etc) for 72 hours to reduce the risk of bleeding. You may take Tylenol (acetaminophen) for pain.  ? In addition to your home medications, take the following post-operative medications as prescribed by your physician.    ? Apply Erythromycin antibiotic ointment to eyelid sutures three times a day, and into the operated eye(s) at night until follow up. Wait until the bolster is removed before you start doing this.   ? Apply bacitracin to the ear incision three times a day until follow up appointment.  ? In many cases, postoperative discomfort can be managed with Tylenol alone. If narcotic pain medication was prescribed, take  pain pills at prescribed frequency as needed for pain.    ? WARNING: Most prescription pain medications contain Tylenol  (acetaminophen). You must not take more than 4,000 mg of acetaminophen per  24-hour period. This is equivalent to 6 tablets of Darvocet, 12 tablets of Norco, Percocet or Tylenol #3. If you take other over-the-counter medications containing acetaminophen, you must take the amount of acetaminophen into account and reduce the number of prescribed pain pills accordingly.          [facility]  Home Care Following Anesthesia  For 24 hours after surgery:  1. Get plenty of rest.  A responsible adult must stay with you for at least 24 hours after you leave the surgery center.  2. Do not drive or use heavy equipment.  If you have weakness or tingling, don't drive or use heavy equipment until this feeling goes away.   3. Do not drink alcohol.   4. Avoid strenuous or risky activities.  Ask for help when climbing stairs.  5. You may feel lightheaded.  IF so, sit for a few minutes before standing.  Have someone help you get up.   6. If you have nausea (feel sick to your stomach): Drink only clear liquids such as apple juice, ginger ale, broth or 7-Up.  Rest may also help.  Be sure to drink enough fluids.  Move to a regular diet as you feel able.   7. You may have a slight fever.  Call the doctor if your fever is over 100 F (37.7 C) (taken under the tongue) or lasts longer than 24 hours.  8. You may have a dry mouth, a sore throat, muscle aches or trouble sleeping. These should go away after 24 hours.  9. Do not make important or legal decisions.               Tips for taking pain medications  To get the best pain relief possible, remember these points:    Take pain medications as directed, before pain becomes severe.    Pain medication can upset your stomach: taking it with food may help.    Constipation is a common side effect of pain medication. Drink plenty of   fluids.    Eat foods high in fiber. Take a stool softener if recommended by your doctor or pharmacist.    Do not drink alcohol, drive or operate machinery while taking pain medications.    Ask about other ways to control pain, such as with heat, ice or relaxation.    Tylenol/Acetaminophen Consumption  To help encourage the safe use of acetaminophen, the makers of TYLENOL  have lowered the maximum daily dose for single-ingredient Extra Strength TYLENOL  (acetaminophen) products sold in the [place] from 8 pills per day (4,000 mg) to 6 pills per day (3,000 mg). The dosing interval has also changed from 2 pills every 4-6 hours to 2 pills every 6 hours.    If you feel your pain relief is insufficient, you may take Tylenol/Acetaminophen in addition to your narcotic pain medication.     Be careful not to exceed 3,000 mg of Tylenol/Acetaminophen in a 24 hour period from all sources.    If you are taking extra strength Tylenol/acetaminophen (500 mg), the maximum dose is 6 tablets in 24 hours.    If you are taking regular strength acetaminophen (325 mg), the maximum dose is 9 tablets in 24 hours.    Call a doctor for any of the followin. Signs of infection (fever, growing tenderness at the surgery site, a large amount of drainage or bleeding, severe pain, foul-smelling drainage, redness, swelling).  2. It has been over 8 to 10 hours since surgery and you are still not able to urinate (pass water).  3. Headache for over 24 hours.  4. Numbness, tingling or weakness the day after surgery (if you had spinal anesthesia).  5. Signs of Covid-19 infection (temperature over 100 degrees, shortness of breath, cough, loss of taste/smell, generalized body aches, persistent headache, chills, sore throat, nausea/vomiting/diarrhea)    Your doctor is:       Dr. Akhil Mendez, Ophthalmology: [phone]               Or dial [phone] and ask for the resident on call for:  Ophthalmology  For emergency care, call the:  [facility]  Emergency Department:  [phone] (TTY for hearing impaired: [phone])

## 2020-07-28 ENCOUNTER — OFFICE VISIT (OUTPATIENT)
Dept: OPHTHALMOLOGY | Facility: CLINIC | Age: 75
End: 2020-07-28
Payer: COMMERCIAL

## 2020-07-28 DIAGNOSIS — G51.0 FACIAL PARALYSIS: ICD-10-CM

## 2020-07-28 DIAGNOSIS — D04.30 SQUAMOUS CELL CARCINOMA IN SITU (SCCIS) OF SKIN OF FACE: Primary | ICD-10-CM

## 2020-07-28 DIAGNOSIS — H02.115 CICATRICIAL ECTROPION OF LEFT LOWER EYELID: ICD-10-CM

## 2020-07-28 ASSESSMENT — TONOMETRY
OS_IOP_MMHG: TARS
IOP_METHOD: ICARE
OD_IOP_MMHG: 14

## 2020-07-28 ASSESSMENT — VISUAL ACUITY
OS_SC: TARSO
OD_SC: 20/60
OD_SC+: -1
METHOD: SNELLEN - LINEAR

## 2020-07-28 NOTE — NURSING NOTE
Chief Complaints and History of Present Illnesses   Patient presents with     Post Op (Ophthalmology) Left Eye     POD#5 s/p LLL ectropion repair with full-thickness skin graft (right retroauricular area donor site) and temporary tarsorrhaphy     Chief Complaint(s) and History of Present Illness(es)     Post Op (Ophthalmology) Left Eye     Laterality: left eye    Associated symptoms: eye pain and itching.  Negative for redness and discharge    Pain scale: 3/10    Comments: POD#5 s/p LLL ectropion repair with full-thickness skin graft (right retroauricular area donor site) and temporary tarsorrhaphy              Comments     Patient notes that he is having some itching, occasional pain that comes and goes, denies discharge or bleeding.     Elizabeth SALDIVAR July 28, 2020 8:12 AM

## 2020-07-28 NOTE — PROGRESS NOTES
Chief Complaint(s) and History of Present Illness(es)     Post Op (Ophthalmology) Left Eye     Laterality: left eye    Associated symptoms: eye pain and itching.  Negative for redness and   discharge    Pain scale: 3/10    Comments: POD#5 s/p LLL ectropion repair with full-thickness skin graft   (right retroauricular area donor site) and temporary tarsorrhaphy           Comments     Patient notes that he is having some itching, occasional pain that comes   and goes, denies discharge or bleeding.     Elizabeth Fouzia COT July 28, 2020 8:12 AM           Incision(s) healing well.  The lid(s)  is  in excellent position.    I have recommended:  * Continue antibiotic ointment or bland lubricating ointment (eg vaseline or aquaphor) to the incision site at bedtime.   * Can massage along the incision 2-3 x daily.   * Warm soaks 2x daily until all edema and ecchymoses resolve  * Return to clinic in 2-3 months     Attending Physician Attestation:  Complete documentation of historical and exam elements from today's encounter can be found in the full encounter summary report (not reduplicated in this progress note).  I personally obtained the chief complaint(s) and history of present illness.  I confirmed and edited as necessary the review of systems, past medical/surgical history, family history, social history, and examination findings as documented by others; and I examined the patient myself.  I personally reviewed the relevant tests, images, and reports as documented above.  I formulated and edited as necessary the assessment and plan and discussed the findings and management plan with the patient and family. - Akhil Mendez MD

## 2020-09-03 ENCOUNTER — TRANSFERRED RECORDS (OUTPATIENT)
Dept: HEALTH INFORMATION MANAGEMENT | Facility: CLINIC | Age: 75
End: 2020-09-03

## 2020-09-03 LAB — HEMOCCULT STL QL IA: NEGATIVE

## 2020-09-16 ENCOUNTER — TELEPHONE (OUTPATIENT)
Dept: OPHTHALMOLOGY | Facility: CLINIC | Age: 75
End: 2020-09-16

## 2020-09-16 DIAGNOSIS — H02.115 CICATRICIAL ECTROPION OF LEFT LOWER EYELID: ICD-10-CM

## 2020-09-16 RX ORDER — ERYTHROMYCIN 5 MG/G
OINTMENT OPHTHALMIC
Qty: 2 TUBE | Refills: 1 | Status: SHIPPED | OUTPATIENT
Start: 2020-09-16 | End: 2021-02-10

## 2020-09-16 NOTE — TELEPHONE ENCOUNTER
Hard to say without photos, will wait to see photos.  Otherwise, warm compresses until we can see photos.

## 2020-09-16 NOTE — TELEPHONE ENCOUNTER
Spoke to pt at 1108  3 days ago new left lower lid redness, swelling with warmth  Has not seemed to have worsened in past 3 days    No redness on white part of eye  No vision changes    S/p left lower eyelide ectropion repair with full-thichkness skin graft and temporary tarsorrhaphy 7-    Pt at work at work and would not be able to email image til this afternoon    Pt not on mychart    Will ask  to assist in review of plan of care-- in OR and pt aware should be able review plan between cases    Uche Borges RN 11:14 AM 09/16/20          [facility]    Phone Message    May a detailed message be left on voicemail: yes     Reason for Call: Symptoms or Concerns     If patient has red-flag symptoms, warm transfer to triage line    Current symptom or concern: Swollen, Sore and Red, Lt eye had surgery on    Symptoms have been present for:  3 day(s)    Has patient previously been seen for this? Yes    By : Dr Mendez    Date: 7/28/2020    Are there any new or worsening symptoms? Yes      Action Taken: Message routed to:  [facility] eye    Travel Screening: Not Applicable

## 2020-09-16 NOTE — TELEPHONE ENCOUNTER
Reviewed by Dr. Rosales    Pt to start Erythromycin ointment 4 day for 5-7 days and restart warm compresses 2/day for 10 minutes and may perform more often    Pt verbally demonstrated understanding and aware to call for any worsening symptoms and aware to call in about a week if not improving      Uche Borges RN 5:02 PM 09/16/20    ----          Image received by triage from pt via [email or web address]    Will forward to Dr. rosales for review    Uche Borges RN 4:04 PM 09/16/20

## 2020-10-06 ENCOUNTER — TELEPHONE (OUTPATIENT)
Dept: OPHTHALMOLOGY | Facility: CLINIC | Age: 75
End: 2020-10-06

## 2020-10-07 ENCOUNTER — OFFICE VISIT (OUTPATIENT)
Dept: OPHTHALMOLOGY | Facility: CLINIC | Age: 75
End: 2020-10-07
Payer: COMMERCIAL

## 2020-10-07 DIAGNOSIS — C44.1292 SQUAMOUS CELL CARCINOMA (SCC) OF LOWER EYELID OF LEFT EYE: ICD-10-CM

## 2020-10-07 DIAGNOSIS — G51.0 FACIAL PARALYSIS: ICD-10-CM

## 2020-10-07 DIAGNOSIS — H02.115 CICATRICIAL ECTROPION OF LEFT LOWER EYELID: Primary | ICD-10-CM

## 2020-10-07 PROCEDURE — 99024 POSTOP FOLLOW-UP VISIT: CPT | Performed by: OPHTHALMOLOGY

## 2020-10-07 ASSESSMENT — TONOMETRY
IOP_METHOD: ICARE
OS_IOP_MMHG: 11
OD_IOP_MMHG: 12

## 2020-10-07 ASSESSMENT — VISUAL ACUITY
OD_SC: 20/60
OD_SC+: -2
METHOD: SNELLEN - LINEAR
OS_SC: 20/60

## 2020-10-07 NOTE — PROGRESS NOTES
Chief Complaint(s) and History of Present Illness(es)     Swelling Around Left Eye     Laterality: left lower lid    Associated symptoms: lid swelling, red eye and blurred vision              Comments     Patient presents today with redness and swelling of left lower eyelid.   Has been ongoing 3 weeks. [facility], [facility] told [facility] he has pink   eye. Has been using erythromycin ointment and ATs. Vision also appears   blurred on the left side.    LLL ectropion repair with full thickness skin graft 7/23/2020          Assessment & Plan     Jaswinder Li is a 75 year old male with the following diagnoses:     Encounter Diagnoses   Name Primary?     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 Had cicatricial and paralytic and cicatricial ectropion. Paralytic component is improving. Lid in good position s/p ftsg.     He has punctate epithelial erosions, and I suspect blurry vision is both dry eye and ees tammy. He has a small pterygium but doesn't appear inflamed at this time.    Plan: Warm compresses twice a day. Use Refresh Celluvisc every two hours. F/u after returns from [place].     Attending Physician Attestation: Complete documentation of historical and exam elements from today's encounter can be found in the full encounter summary report (not reduplicated in this progress note). I personally obtained the chief complaint(s) and history of present illness. I confirmed and edited as necessary the review of systems, past medical/surgical history, family history, social history, and examination findings as documented by others; and I examined the patient myself. I personally reviewed the relevant tests, images, and reports as documented above. I formulated and edited as necessary the assessment and plan and discussed the findings and  management plan with the patient.  -Akhil Mendez MD

## 2020-10-07 NOTE — PATIENT INSTRUCTIONS
Use Refresh Celluvisc eye drops every 2 hours as well as before bed. Use warm compresses twice daily over the left eye.

## 2020-10-07 NOTE — NURSING NOTE
Chief Complaints and History of Present Illnesses   Patient presents with     Swelling Around Left Eye       Chief Complaint(s) and History of Present Illness(es)     Swelling Around Left Eye     Laterality: left lower lid    Associated symptoms: lid swelling, red eye and blurred vision              Comments     Patient presents today with redness and swelling of left lower eyelid. Has been ongoing 3 weeks. [facility], [facility] told [facility] he has pink eye. Has been using erythromycin ointment and ATs. Vision also appears blurred on the left side.    LLL ectropion repair with full thickness skin graft 7/23/2020                Aleida Lerner, COA

## 2021-02-10 ENCOUNTER — OFFICE VISIT (OUTPATIENT)
Dept: OPHTHALMOLOGY | Facility: CLINIC | Age: 76
End: 2021-02-10
Payer: COMMERCIAL

## 2021-02-10 DIAGNOSIS — H57.12 PAIN AROUND LEFT EYE: ICD-10-CM

## 2021-02-10 DIAGNOSIS — H02.115 CICATRICIAL ECTROPION OF LEFT LOWER EYELID: ICD-10-CM

## 2021-02-10 DIAGNOSIS — C44.1292 SQUAMOUS CELL CARCINOMA (SCC) OF LOWER EYELID OF LEFT EYE: Primary | ICD-10-CM

## 2021-02-10 DIAGNOSIS — G51.0 FACIAL PARALYSIS: ICD-10-CM

## 2021-02-10 PROCEDURE — 99214 OFFICE O/P EST MOD 30 MIN: CPT | Performed by: OPHTHALMOLOGY

## 2021-02-10 ASSESSMENT — TONOMETRY
OD_IOP_MMHG: 15
OS_IOP_MMHG: 11
IOP_METHOD: ICARE

## 2021-02-10 ASSESSMENT — VISUAL ACUITY
OS_SC+: -2
METHOD: SNELLEN - LINEAR
OD_SC: 20/60
OS_SC: 20/30
OD_SC+: -1

## 2021-02-10 ASSESSMENT — CONF VISUAL FIELD
OS_NORMAL: 1
OD_NORMAL: 1
METHOD: COUNTING FINGERS

## 2021-02-10 ASSESSMENT — EXTERNAL EXAM - RIGHT EYE: OD_EXAM: NORMAL

## 2021-02-10 NOTE — NURSING NOTE
No chief complaint on file.      Chief Complaint(s) and History of Present Illness(es)     S/P  lower left lid ectropion repair, 10/07/2020. Patient says he will get pain behind the left eye 2-3 times a week, does not feel it is related to the procedure. No concerns from the patient for today.           Derik Kelly, Ophthalmic Assistant

## 2021-02-10 NOTE — PROGRESS NOTES
Chief Complaint(s) and History of Present Illness(es)     S/P  lower left lid ectropion repair, 10/07/2020. Patient says he will   get pain behind the left eye 2-3 times a week, does not feel it is related   to the procedure. No concerns from the patient for today.    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  Previously, he had undergone resection of a large squamous cell carcinoma followed by what appeared to be a free flap reconstruction of the left cheek.  He had issues at the vascular anastomosis site with extrusion of multiple vascular clips which required removal of the clips but no further surgery.  In July 2020 he underwent left lower eyelid reconstruction with a full-thickness skin graft from a right retroauricular donor site.  Since then he has done very well. He will undergo flap debulking at the [facility] in two weeks.     He has noticed a dull ache behind his eye lasting minutes several times a week. He has tried artificial tears, but unsure if that has helped. He also gets pain in the back of his head.    Assessment & Plan     Jaswinder Li is a 75 year old male with the following diagnoses:   Encounter Diagnoses   Name Primary?     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 Cornea looks great.  Facial paralysis has improved dramatically.   Lower eyelid in excellent position.  I suspect his retro-orbital pain may be referred pain from his occipital neuralgia. He notes he is minimally symptomatic, and symptoms only last minutes. At this point will not work it up further but if it worsens he should return to see me or see [facility].   Use artificial tears as needed.   D/c erythromycin ophthalmic  ointment.      Attending Physician Attestation: Complete documentation of historical and exam elements from today's encounter can be found in the full encounter summary report (not reduplicated in this progress note). I personally obtained the chief complaint(s) and history of present illness. I confirmed and edited as necessary the review of systems, past medical/surgical history, family history, social history, and examination findings as documented by others; and I examined the patient myself. I personally reviewed the relevant tests, images, and reports as documented above. I formulated and edited as necessary the assessment and plan and discussed the findings and management plan with the patient.  -Akhil Mendez MD

## 2021-05-05 ENCOUNTER — APPOINTMENT (OUTPATIENT)
Dept: URBAN - METROPOLITAN AREA CLINIC 252 | Age: 76
Setting detail: DERMATOLOGY
End: 2021-05-05

## 2021-05-05 VITALS — HEIGHT: 66 IN | RESPIRATION RATE: 16 BRPM | WEIGHT: 160 LBS

## 2021-05-05 DIAGNOSIS — Z85.828 PERSONAL HISTORY OF OTHER MALIGNANT NEOPLASM OF SKIN: ICD-10-CM

## 2021-05-05 DIAGNOSIS — L57.0 ACTINIC KERATOSIS: ICD-10-CM

## 2021-05-05 PROCEDURE — OTHER PRESCRIPTION: OTHER

## 2021-05-05 PROCEDURE — 99203 OFFICE O/P NEW LOW 30 MIN: CPT

## 2021-05-05 PROCEDURE — OTHER COUNSELING: OTHER

## 2021-05-05 PROCEDURE — OTHER ADDITIONAL NOTES: OTHER

## 2021-05-05 RX ORDER — FLUOROURACIL 5 MG/G
0.5% CREAM TOPICAL QD
Qty: 1 | Refills: 3 | Status: ERX | COMMUNITY
Start: 2021-05-05

## 2021-05-05 ASSESSMENT — LOCATION DETAILED DESCRIPTION DERM
LOCATION DETAILED: LEFT INFERIOR CENTRAL MALAR CHEEK
LOCATION DETAILED: LEFT INFERIOR LATERAL MALAR CHEEK
LOCATION DETAILED: RIGHT CENTRAL MALAR CHEEK

## 2021-05-05 ASSESSMENT — LOCATION SIMPLE DESCRIPTION DERM
LOCATION SIMPLE: LEFT CHEEK
LOCATION SIMPLE: RIGHT CHEEK

## 2021-05-05 ASSESSMENT — LOCATION ZONE DERM: LOCATION ZONE: FACE

## 2021-05-05 NOTE — HPI: SKIN LESION
Is This A New Presentation, Or A Follow-Up?: Skin Lesion
Additional History: Had an unknown aggressive squamous cell carcinoma on left cheek 1 year ago. Has had 2 pet scans that were clear most recently 2 month ago and and scheduled for 4 weeks. Next week has another full body skin exam scheduled.

## 2021-05-05 NOTE — PROCEDURE: ADDITIONAL NOTES
Additional Notes: Discussed treatment options of seeing plastics to consider tissue expansion and flap vs camouflage tattooing. \\nPatient given information: [facility] in [place] [phone]. Do not tattoo over the scar. Additional Notes: Discussed treatment options of seeing plastics to consider tissue expansion and flap vs camouflage tattooing. \\nPatient given information: [facility] in [place] [phone]. Do not tattoo over the scar.

## 2021-05-05 NOTE — PROCEDURE: COUNSELING
Detail Level: Simple
Patient Specific Counseling (Will Not Stick From Patient To Patient): - Discussed patient will begin apply Carac 0.5% cream everyday to the scalp and cheeks and if tolerated will also treat the central face for 30 days. Patient can mix with calcipotriene cream which patient has at home. Wait until his upcoming full body skin exam next week. Offered full body skin exam today bit he declined as he wants to have the full body skin exam next week since it took a few months to get in there. Recommended full body skin exam every 3 months for the foreseeable future. \\n- if Carac is not covered by insurance or too expensive will consider treating with Soloraze applying twice daily for 90 days.\\n- Patient will start topical medication once cota has had his next FBSE next week and follow up with us one month after completing treatment as directed. Advised he has several actinic keratoses and quite possible squamous cell carcinoma but he is declining procedures today.
Detail Level: Detailed

## 2021-08-20 ENCOUNTER — APPOINTMENT (OUTPATIENT)
Dept: URBAN - METROPOLITAN AREA CLINIC 252 | Age: 76
Setting detail: DERMATOLOGY
End: 2021-08-20

## 2021-08-20 VITALS — HEIGHT: 66 IN | WEIGHT: 160 LBS

## 2021-08-20 DIAGNOSIS — L57.0 ACTINIC KERATOSIS: ICD-10-CM

## 2021-08-20 PROBLEM — C44.329 SQUAMOUS CELL CARCINOMA OF SKIN OF OTHER PARTS OF FACE: Status: ACTIVE | Noted: 2021-08-20

## 2021-08-20 PROCEDURE — OTHER PRESCRIPTION MEDICATION MANAGEMENT: OTHER

## 2021-08-20 PROCEDURE — OTHER COUNSELING: OTHER

## 2021-08-20 PROCEDURE — 99213 OFFICE O/P EST LOW 20 MIN: CPT

## 2021-08-20 ASSESSMENT — LOCATION SIMPLE DESCRIPTION DERM
LOCATION SIMPLE: LEFT CHEEK
LOCATION SIMPLE: RIGHT CHEEK
LOCATION SIMPLE: RIGHT FOREHEAD

## 2021-08-20 ASSESSMENT — LOCATION DETAILED DESCRIPTION DERM
LOCATION DETAILED: RIGHT INFERIOR MEDIAL FOREHEAD
LOCATION DETAILED: RIGHT INFERIOR CENTRAL MALAR CHEEK
LOCATION DETAILED: LEFT SUPERIOR LATERAL MALAR CHEEK

## 2021-08-20 ASSESSMENT — LOCATION ZONE DERM: LOCATION ZONE: FACE

## 2021-08-20 NOTE — PROCEDURE: PRESCRIPTION MEDICATION MANAGEMENT
Render In Strict Bullet Format?: No
Continue Regimen: Continue Carac topical cream QD as tolerated, using as a field treatment on R side of face for 30 days. Discussed following up with a dermatologist in [place] one month after finishing Carac tx.
Detail Level: Zone

## 2021-08-20 NOTE — PROCEDURE: COUNSELING
Patient Specific Counseling (Will Not Stick From Patient To Patient): Discussed treatment options of SRT vs. Mohs surgery. Discussed if an aggressive form of SCC, then would elect to do Mohs over SRT. Pt would prefer SRT if appropriate for type of SCC. See phone note with verbal result once [facility], but a [facility] form has been sent as well for a hard copy. Patient expressed understanding. He will be leaving to [place] late October.
Patient Specific Counseling (Will Not Stick From Patient To Patient): - Discussed Carac treatment of full face except for chin area and except for around the eyes. \\n- Use Carac once per day for 30 days. Recommended treating right side first so as to no affect srt treatment on left cheek. \\n- Patient expressed understanding. After srt is completed, use carac on left face. Patient expressed understanding.
Detail Level: Zone
Detail Level: Detailed

## 2021-08-20 NOTE — HPI: SKIN LESION
Is This A New Presentation, Or A Follow-Up?: Skin Lesion
Additional History: [facility] [place] and he reports squamous cell carcinoma for both. We will be requesting the pathology report today.

## 2021-08-20 NOTE — HPI: SKIN LESIONS
Is This A New Presentation, Or A Follow-Up?: Growths
Additional History: Patient has many hyperkeratotic lesion on the scattered face. We had prescribed Carac for him in May 2021, but he has not yet used as a field treatment. He prefers to wait until the summer months are done to treat.

## 2021-09-27 ENCOUNTER — APPOINTMENT (OUTPATIENT)
Dept: URBAN - METROPOLITAN AREA CLINIC 259 | Age: 76
Setting detail: DERMATOLOGY
End: 2021-09-27

## 2021-09-27 DIAGNOSIS — L57.8 OTHER SKIN CHANGES DUE TO CHRONIC EXPOSURE TO NONIONIZING RADIATION: ICD-10-CM

## 2021-09-27 DIAGNOSIS — L90.5 SCAR CONDITIONS AND FIBROSIS OF SKIN: ICD-10-CM

## 2021-09-27 PROBLEM — C44.329 SQUAMOUS CELL CARCINOMA OF SKIN OF OTHER PARTS OF FACE: Status: ACTIVE | Noted: 2021-09-27

## 2021-09-27 PROCEDURE — OTHER MIPS QUALITY: OTHER

## 2021-09-27 PROCEDURE — 13132 CMPLX RPR F/C/C/M/N/AX/G/H/F: CPT

## 2021-09-27 PROCEDURE — OTHER COUNSELING: OTHER

## 2021-09-27 PROCEDURE — OTHER MOHS BY LAYER: OTHER

## 2021-09-27 PROCEDURE — 17311 MOHS 1 STAGE H/N/HF/G: CPT

## 2021-09-27 PROCEDURE — 99213 OFFICE O/P EST LOW 20 MIN: CPT | Mod: 25

## 2021-09-27 ASSESSMENT — LOCATION ZONE DERM
LOCATION ZONE: FACE
LOCATION ZONE: TRUNK

## 2021-09-27 ASSESSMENT — LOCATION SIMPLE DESCRIPTION DERM
LOCATION SIMPLE: LEFT CHEEK
LOCATION SIMPLE: RIGHT UPPER BACK

## 2021-09-27 ASSESSMENT — LOCATION DETAILED DESCRIPTION DERM
LOCATION DETAILED: LEFT INFERIOR CENTRAL MALAR CHEEK
LOCATION DETAILED: RIGHT MEDIAL UPPER BACK

## 2021-09-27 NOTE — PROCEDURE: MOHS BY LAYER
Complex Requirements: Exposure Of Vital Structure?: No
Complex Requirements: Extensive Undermining Performed?: Yes
Stage 9: Number Of Sections (Blocks) ?: 0
Stage 1: Defect X-Dimension: 2
Undermining Type: Entire Wound
Estimated Blood Loss (Cc): minimal
Post-Care Instructions: I reviewed with the patient in detail post-care instructions. Patient is not to engage in any heavy lifting, exercise, or swimming for the next 14 days. Should the patient develop any fevers, chills, bleeding, severe pain patient will contact the office immediately.
Consent: The rationale for Mohs was explained to the patient and consent was obtained. The risks, benefits and alternatives to therapy were discussed in detail. Specifically, the risks of infection, scarring, bleeding, prolonged wound healing, incomplete removal, allergy to anesthesia, nerve injury and recurrence were addressed. Prior to the procedure, the treatment site was clearly identified and confirmed by the patient. All components of Universal Protocol/PAUSE Rule completed.
Anesthesia Type: 1% lidocaine with epinephrine
Number Of Stages: 1
Simple / Intermediate / Complex Repair - Final Wound Length In Cm: 3
Suture Removal: 7 days
Size Of Lesion: 1.5
Epidermal Sutures: 6-0 Nylon
Hemostasis: Electrocautery
Detail Level: Detailed
Deep Sutures: 5-0 PGA
Distance Of Undermining In Cm (Required): 1.3
Debridement Text: The wound edges were debrided prior to proceeding with the closure to facilitate wound healing.
Nostril Rim Text: The closure involved the nostril rim.
Epidermal Closure: simple interrupted
Vermilion Border Text: The closure involved the vermilion border.
Repair Type: Complex Repair
Helical Rim Text: The closure involved the helical rim.
Stage 1: Depth Of Layer: adipose tissue
Wound Care: Petrolatum
Retention Suture Text: Retention sutures were placed to support the closure and prevent dehiscence.
Body Location Override (Optional - Billing Will Still Be Based On Selected Body Map Location If Applicable): left superior and inferior central malar cheek
